# Patient Record
Sex: MALE | Race: WHITE | NOT HISPANIC OR LATINO | URBAN - METROPOLITAN AREA
[De-identification: names, ages, dates, MRNs, and addresses within clinical notes are randomized per-mention and may not be internally consistent; named-entity substitution may affect disease eponyms.]

---

## 2018-03-27 ENCOUNTER — OFFICE VISIT (OUTPATIENT)
Dept: AUDIOLOGY | Age: 63
End: 2018-03-27
Payer: COMMERCIAL

## 2018-03-27 DIAGNOSIS — H81.311 AUDITORY VERTIGO INVOLVING RIGHT EAR: Primary | ICD-10-CM

## 2018-03-27 PROCEDURE — 92540 BASIC VESTIBULAR EVALUATION: CPT | Performed by: AUDIOLOGIST

## 2018-03-27 PROCEDURE — 92537 CALORIC VSTBLR TEST W/REC: CPT | Performed by: AUDIOLOGIST

## 2018-03-27 PROCEDURE — 92567 TYMPANOMETRY: CPT | Performed by: AUDIOLOGIST

## 2018-03-27 NOTE — PROGRESS NOTES
[facility] Report    Name:  Ken Curry  :  1955  Age:  63 y o  Date of Evaluation: 18     History: Dizziness  Reason for visit: Ken Curry is seen today at the request of Dr Emily Santos for an evaluation of hearing  Patient complains of dizziness and being off balance  Provoked by head turns, body movements, driving and walking down [place]le in the grocery store       Tympanometry:   Right: Type A Volume: 1 8  Pressure: -5  Compliance: 1 5   Left: Type A Volume: 2 0  Pressure: -30  Compliance: 0 4    Oculomotor battery:    Gaze:          Right: Normal        Left: Normal         Up: Normal        Down: Normal      Saccades: Normal     Tracking: Normal     Optokinetic: Normal    Positioning/Positionals:     [employer]:    Right: Negative  , Subject dizziness    Left: Negative  , Subject dizziness      Positionals:     Sitting: Normal    Supine: Normal    Head Right: Normal    Head Left: Normal    Body Right: Normal    Body Left[de-identified] Normal      Calorics:     Birthermal Caloric Irrigation: Abnormal Unilateral Weakness Right -39%      Recommendations: Consider vestibular physcial therapy evaluation and rehabilitation through Marcella Unger , CCC-A  Clinical Audiologist

## 2018-03-27 NOTE — LETTER
2018     [facility]    Patient: Taty Kent   YOB: 1955   Date of Visit: 3/27/2018       Dear Dr Huy Nair:    Thank you for referring Taty Kent to me for evaluation  Below are my notes for this consultation  If you have questions, please do not hesitate to call me  I look forward to following your patient along with you  Sincerely,        Beckieelieser Hung        CC: No Recipients  Beckie Hung  3/27/2018  3:41 PM  Signed  V[facility] Report    Name:  Taty Kent  :  1955  Age:  63 y o  Date of Evaluation: 18     History: Dizziness  Reason for visit: Taty Kent is seen today at the request of Dr Huy Nair for an evaluation of hearing  Patient complains of dizziness and being off balance  Provoked by head turns, body movements, driving and walking down [place] in the grocery store       Tympanometry:   Right: Type A Volume: 1 8  Pressure: -5  Compliance: 1 5   Left: Type A Volume: 2 0  Pressure: -30  Compliance: 0 4    Oculomotor battery:    Gaze:          Right: Normal        Left: Normal         Up: Normal        Down: Normal      Saccades: Normal     Tracking: Normal     Optokinetic: Normal    Positioning/Positionals:     [employer]:    Right: Negative  , Subject dizziness    Left: Negative  , Subject dizziness      Positionals:     Sitting: Normal    Supine: Normal    Head Right: Normal    Head Left: Normal    Body Right: Normal    Body Left[de-identified] Normal      Calorics:     Birthermal Caloric Irrigation: Abnormal Unilateral Weakness Right -39%      Recommendations: Consider vestibular physcial therapy evaluation and rehabilitation through [address], Marcella TILLMAN , CCC-A  Clinical Audiologist

## 2018-03-27 NOTE — LETTER
2018     Charles Morales,  [address]  [address] [id number]    Patient: Krista Ramirez   YOB: 1955   Date of Visit: 3/27/2018       Dear Dr Bran Morales:    Thank you for referring Krista Ramirez to me for evaluation  Below are my notes for this consultation  If you have questions, please do not hesitate to call me  I look forward to following your patient along with you  Sincerely,        [place]        CC: No Recipients  [place]  3/27/2018  3:39 PM  Sign at close encounter  [facility] Report    Name:  Krista Ramirez  :  1955  Age:  63 y o  Date of Evaluation: 18     History: Dizziness  Reason for visit: Krista Ramirez is seen today at the request of Dr Bran Morales for an evaluation of hearing  Patient complains of dizziness and being off balance  Provoked by head turns, body movements, driving and walking down [place] in the grocery store       Tympanometry:   Right: Type A Volume: 1 8  Pressure: -5  Compliance: 1 5   Left: Type A Volume: 2 0  Pressure: -30  Compliance: 0 4    Oculomotor battery:    Gaze:          Right: Normal        Left: Normal         Up: Normal        Down: Normal      Saccades: Normal     Tracking: Normal     Optokinetic: Normal    Positioning/Positionals:     [employer]:    Right: Negative  , Subject dizziness    Left: Negative  , Subject dizziness      Positionals:     Sitting: Normal    Supine: Normal    Head Right: Normal    Head Left: Normal    Body Right: Normal    Body Left[de-identified] Normal      Calorics:     Birthermal Caloric Irrigation: Abnormal Unilateral Weakness Right -39%      Recommendations: Consider vestibular physcial therapy evaluation and rehabilitation through Marcella Unger , CCC-A  Clinical Audiologist

## 2020-01-03 ENCOUNTER — TRANSCRIBE ORDERS (OUTPATIENT)
Dept: ADMINISTRATIVE | Facility: HOSPITAL | Age: 65
End: 2020-01-03

## 2020-01-03 DIAGNOSIS — K40.20 BILATERAL INGUINAL HERNIA WITHOUT OBSTRUCTION OR GANGRENE, RECURRENCE NOT SPECIFIED: Primary | ICD-10-CM

## 2022-10-18 LAB
LEFT EYE DIABETIC RETINOPATHY: NORMAL
RIGHT EYE DIABETIC RETINOPATHY: NORMAL

## 2023-07-09 ENCOUNTER — APPOINTMENT (EMERGENCY)
Dept: CT IMAGING | Facility: HOSPITAL | Age: 68
DRG: 868 | End: 2023-07-09
Payer: COMMERCIAL

## 2023-07-09 ENCOUNTER — APPOINTMENT (EMERGENCY)
Dept: RADIOLOGY | Facility: HOSPITAL | Age: 68
DRG: 868 | End: 2023-07-09
Payer: COMMERCIAL

## 2023-07-09 ENCOUNTER — HOSPITAL ENCOUNTER (INPATIENT)
Facility: HOSPITAL | Age: 68
LOS: 2 days | Discharge: HOME | DRG: 868 | End: 2023-07-12
Attending: EMERGENCY MEDICINE | Admitting: INTERNAL MEDICINE
Payer: COMMERCIAL

## 2023-07-09 DIAGNOSIS — M25.512 LEFT SHOULDER PAIN: ICD-10-CM

## 2023-07-09 DIAGNOSIS — I48.91 ATRIAL FIBRILLATION WITH RVR (HCC): Primary | ICD-10-CM

## 2023-07-09 DIAGNOSIS — E11.65 TYPE 2 DIABETES MELLITUS WITH HYPERGLYCEMIA, WITHOUT LONG-TERM CURRENT USE OF INSULIN (HCC): ICD-10-CM

## 2023-07-09 DIAGNOSIS — A69.20 LYME DISEASE: ICD-10-CM

## 2023-07-09 PROBLEM — M25.531 RIGHT WRIST PAIN: Status: ACTIVE | Noted: 2023-07-09

## 2023-07-09 PROBLEM — M25.539 WRIST PAIN: Status: ACTIVE | Noted: 2023-07-09

## 2023-07-09 LAB
2HR DELTA HS TROPONIN: -1 NG/L
4HR DELTA HS TROPONIN: -2 NG/L
ALBUMIN SERPL BCP-MCNC: 4.1 G/DL (ref 3.5–5)
ALP SERPL-CCNC: 118 U/L (ref 34–104)
ALT SERPL W P-5'-P-CCNC: 14 U/L (ref 7–52)
ANION GAP SERPL CALCULATED.3IONS-SCNC: 10 MMOL/L
APTT PPP: 27 SECONDS (ref 23–37)
APTT PPP: 28 SECONDS (ref 23–37)
APTT PPP: 29 SECONDS (ref 23–37)
AST SERPL W P-5'-P-CCNC: 9 U/L (ref 13–39)
ATRIAL RATE: 122 BPM
ATRIAL RATE: 170 BPM
BACTERIA UR QL AUTO: NORMAL /HPF
BASE EX.OXY STD BLDV CALC-SCNC: 82.4 % (ref 60–80)
BASE EXCESS BLDV CALC-SCNC: -3.5 MMOL/L
BASOPHILS # BLD AUTO: 0.11 THOUSANDS/ÂΜL (ref 0–0.1)
BASOPHILS NFR BLD AUTO: 1 % (ref 0–1)
BILIRUB SERPL-MCNC: 1.31 MG/DL (ref 0.2–1)
BILIRUB UR QL STRIP: NEGATIVE
BNP SERPL-MCNC: 139 PG/ML (ref 0–100)
BUN SERPL-MCNC: 18 MG/DL (ref 5–25)
CALCIUM SERPL-MCNC: 9.5 MG/DL (ref 8.4–10.2)
CARDIAC TROPONIN I PNL SERPL HS: 4 NG/L
CARDIAC TROPONIN I PNL SERPL HS: 5 NG/L
CARDIAC TROPONIN I PNL SERPL HS: 6 NG/L
CHLORIDE SERPL-SCNC: 99 MMOL/L (ref 96–108)
CLARITY UR: CLEAR
CO2 SERPL-SCNC: 19 MMOL/L (ref 21–32)
COLOR UR: ABNORMAL
CREAT SERPL-MCNC: 1.06 MG/DL (ref 0.6–1.3)
CRP SERPL QL: 119.1 MG/L
EOSINOPHIL # BLD AUTO: 0.05 THOUSAND/ÂΜL (ref 0–0.61)
EOSINOPHIL NFR BLD AUTO: 0 % (ref 0–6)
ERYTHROCYTE [DISTWIDTH] IN BLOOD BY AUTOMATED COUNT: 11.6 % (ref 11.6–15.1)
GFR SERPL CREATININE-BSD FRML MDRD: 73 ML/MIN/1.73SQ M
GLUCOSE SERPL-MCNC: 200 MG/DL (ref 65–140)
GLUCOSE SERPL-MCNC: 325 MG/DL (ref 65–140)
GLUCOSE SERPL-MCNC: 403 MG/DL (ref 65–140)
GLUCOSE UR STRIP-MCNC: ABNORMAL MG/DL
HCO3 BLDV-SCNC: 20.4 MMOL/L (ref 24–30)
HCT VFR BLD AUTO: 48.1 % (ref 36.5–49.3)
HGB BLD-MCNC: 16.9 G/DL (ref 12–17)
HGB UR QL STRIP.AUTO: ABNORMAL
IMM GRANULOCYTES # BLD AUTO: 0.09 THOUSAND/UL (ref 0–0.2)
IMM GRANULOCYTES NFR BLD AUTO: 1 % (ref 0–2)
INR PPP: 1.12 (ref 0.84–1.19)
INR PPP: 1.12 (ref 0.84–1.19)
KETONES UR STRIP-MCNC: ABNORMAL MG/DL
LACTATE SERPL-SCNC: 1.1 MMOL/L (ref 0.5–2)
LEUKOCYTE ESTERASE UR QL STRIP: ABNORMAL
LYMPHOCYTES # BLD AUTO: 1.92 THOUSANDS/ÂΜL (ref 0.6–4.47)
LYMPHOCYTES NFR BLD AUTO: 12 % (ref 14–44)
MCH RBC QN AUTO: 31.5 PG (ref 26.8–34.3)
MCHC RBC AUTO-ENTMCNC: 35.1 G/DL (ref 31.4–37.4)
MCV RBC AUTO: 90 FL (ref 82–98)
MONOCYTES # BLD AUTO: 1.48 THOUSAND/ÂΜL (ref 0.17–1.22)
MONOCYTES NFR BLD AUTO: 10 % (ref 4–12)
NEUTROPHILS # BLD AUTO: 11.94 THOUSANDS/ÂΜL (ref 1.85–7.62)
NEUTS SEG NFR BLD AUTO: 76 % (ref 43–75)
NITRITE UR QL STRIP: NEGATIVE
NON-SQ EPI CELLS URNS QL MICRO: NORMAL /HPF
NRBC BLD AUTO-RTO: 0 /100 WBCS
O2 CT BLDV-SCNC: 20.2 ML/DL
OSMOLALITY UR/SERPL-RTO: 293 MMOL/KG (ref 282–298)
OSMOLALITY UR: 594 MMOL/KG
PCO2 BLDV: 34.2 MM HG (ref 42–50)
PH BLDV: 7.39 [PH] (ref 7.3–7.4)
PH UR STRIP.AUTO: 5 [PH]
PLATELET # BLD AUTO: 250 THOUSANDS/UL (ref 149–390)
PMV BLD AUTO: 9.4 FL (ref 8.9–12.7)
PO2 BLDV: 49.6 MM HG (ref 35–45)
POTASSIUM SERPL-SCNC: 4.6 MMOL/L (ref 3.5–5.3)
PROT SERPL-MCNC: 7.4 G/DL (ref 6.4–8.4)
PROT UR STRIP-MCNC: ABNORMAL MG/DL
PROTHROMBIN TIME: 14.2 SECONDS (ref 11.6–14.5)
PROTHROMBIN TIME: 14.2 SECONDS (ref 11.6–14.5)
QRS AXIS: 19 DEGREES
QRS AXIS: 22 DEGREES
QRSD INTERVAL: 76 MS
QRSD INTERVAL: 78 MS
QT INTERVAL: 310 MS
QT INTERVAL: 318 MS
QTC INTERVAL: 414 MS
QTC INTERVAL: 491 MS
RBC # BLD AUTO: 5.36 MILLION/UL (ref 3.88–5.62)
RBC #/AREA URNS AUTO: NORMAL /HPF
SODIUM 24H UR-SCNC: 54 MOL/L
SODIUM SERPL-SCNC: 128 MMOL/L (ref 135–147)
SP GR UR STRIP.AUTO: 1.03 (ref 1–1.03)
T WAVE AXIS: 60 DEGREES
T WAVE AXIS: 64 DEGREES
TSH SERPL DL<=0.05 MIU/L-ACNC: 3.17 UIU/ML (ref 0.45–4.5)
URATE SERPL-MCNC: 3.1 MG/DL (ref 3.5–8.5)
UROBILINOGEN UR STRIP-ACNC: <2 MG/DL
VENTRICULAR RATE: 102 BPM
VENTRICULAR RATE: 151 BPM
WBC # BLD AUTO: 15.59 THOUSAND/UL (ref 4.31–10.16)
WBC #/AREA URNS AUTO: NORMAL /HPF

## 2023-07-09 PROCEDURE — 86140 C-REACTIVE PROTEIN: CPT | Performed by: INTERNAL MEDICINE

## 2023-07-09 PROCEDURE — 85610 PROTHROMBIN TIME: CPT | Performed by: EMERGENCY MEDICINE

## 2023-07-09 PROCEDURE — 93010 ELECTROCARDIOGRAM REPORT: CPT | Performed by: INTERNAL MEDICINE

## 2023-07-09 PROCEDURE — 96361 HYDRATE IV INFUSION ADD-ON: CPT

## 2023-07-09 PROCEDURE — 85025 COMPLETE CBC W/AUTO DIFF WBC: CPT | Performed by: EMERGENCY MEDICINE

## 2023-07-09 PROCEDURE — 84484 ASSAY OF TROPONIN QUANT: CPT | Performed by: EMERGENCY MEDICINE

## 2023-07-09 PROCEDURE — 71275 CT ANGIOGRAPHY CHEST: CPT

## 2023-07-09 PROCEDURE — 85610 PROTHROMBIN TIME: CPT | Performed by: INTERNAL MEDICINE

## 2023-07-09 PROCEDURE — 99222 1ST HOSP IP/OBS MODERATE 55: CPT | Performed by: INTERNAL MEDICINE

## 2023-07-09 PROCEDURE — 83880 ASSAY OF NATRIURETIC PEPTIDE: CPT | Performed by: EMERGENCY MEDICINE

## 2023-07-09 PROCEDURE — 80053 COMPREHEN METABOLIC PANEL: CPT | Performed by: EMERGENCY MEDICINE

## 2023-07-09 PROCEDURE — 81001 URINALYSIS AUTO W/SCOPE: CPT | Performed by: INTERNAL MEDICINE

## 2023-07-09 PROCEDURE — 73030 X-RAY EXAM OF SHOULDER: CPT

## 2023-07-09 PROCEDURE — 84300 ASSAY OF URINE SODIUM: CPT | Performed by: INTERNAL MEDICINE

## 2023-07-09 PROCEDURE — 82948 REAGENT STRIP/BLOOD GLUCOSE: CPT

## 2023-07-09 PROCEDURE — 93005 ELECTROCARDIOGRAM TRACING: CPT

## 2023-07-09 PROCEDURE — 96376 TX/PRO/DX INJ SAME DRUG ADON: CPT

## 2023-07-09 PROCEDURE — 86617 LYME DISEASE ANTIBODY: CPT

## 2023-07-09 PROCEDURE — 85730 THROMBOPLASTIN TIME PARTIAL: CPT | Performed by: EMERGENCY MEDICINE

## 2023-07-09 PROCEDURE — 96374 THER/PROPH/DIAG INJ IV PUSH: CPT

## 2023-07-09 PROCEDURE — 86618 LYME DISEASE ANTIBODY: CPT | Performed by: INTERNAL MEDICINE

## 2023-07-09 PROCEDURE — 99204 OFFICE O/P NEW MOD 45 MIN: CPT | Performed by: PHYSICIAN ASSISTANT

## 2023-07-09 PROCEDURE — 82805 BLOOD GASES W/O2 SATURATION: CPT | Performed by: EMERGENCY MEDICINE

## 2023-07-09 PROCEDURE — 83935 ASSAY OF URINE OSMOLALITY: CPT | Performed by: INTERNAL MEDICINE

## 2023-07-09 PROCEDURE — 85730 THROMBOPLASTIN TIME PARTIAL: CPT | Performed by: INTERNAL MEDICINE

## 2023-07-09 PROCEDURE — 84550 ASSAY OF BLOOD/URIC ACID: CPT | Performed by: INTERNAL MEDICINE

## 2023-07-09 PROCEDURE — 96375 TX/PRO/DX INJ NEW DRUG ADDON: CPT

## 2023-07-09 PROCEDURE — 84443 ASSAY THYROID STIM HORMONE: CPT | Performed by: INTERNAL MEDICINE

## 2023-07-09 PROCEDURE — 83930 ASSAY OF BLOOD OSMOLALITY: CPT | Performed by: INTERNAL MEDICINE

## 2023-07-09 PROCEDURE — 99285 EMERGENCY DEPT VISIT HI MDM: CPT

## 2023-07-09 PROCEDURE — 99291 CRITICAL CARE FIRST HOUR: CPT | Performed by: EMERGENCY MEDICINE

## 2023-07-09 PROCEDURE — 84484 ASSAY OF TROPONIN QUANT: CPT | Performed by: INTERNAL MEDICINE

## 2023-07-09 PROCEDURE — 36415 COLL VENOUS BLD VENIPUNCTURE: CPT | Performed by: EMERGENCY MEDICINE

## 2023-07-09 PROCEDURE — 83605 ASSAY OF LACTIC ACID: CPT | Performed by: EMERGENCY MEDICINE

## 2023-07-09 RX ORDER — LIFITEGRAST 50 MG/ML
SOLUTION/ DROPS OPHTHALMIC 2 TIMES DAILY
COMMUNITY
Start: 2023-04-04 | End: 2023-07-12

## 2023-07-09 RX ORDER — OXYCODONE HYDROCHLORIDE 5 MG/1
5 TABLET ORAL EVERY 4 HOURS PRN
Status: DISCONTINUED | OUTPATIENT
Start: 2023-07-09 | End: 2023-07-12 | Stop reason: HOSPADM

## 2023-07-09 RX ORDER — ACETAMINOPHEN 325 MG/1
975 TABLET ORAL EVERY 8 HOURS SCHEDULED
Status: DISCONTINUED | OUTPATIENT
Start: 2023-07-09 | End: 2023-07-12 | Stop reason: HOSPADM

## 2023-07-09 RX ORDER — INSULIN LISPRO 100 [IU]/ML
1-6 INJECTION, SOLUTION INTRAVENOUS; SUBCUTANEOUS
Status: DISCONTINUED | OUTPATIENT
Start: 2023-07-09 | End: 2023-07-12 | Stop reason: HOSPADM

## 2023-07-09 RX ORDER — HEPARIN SODIUM 10000 [USP'U]/100ML
3-26 INJECTION, SOLUTION INTRAVENOUS
Status: DISCONTINUED | OUTPATIENT
Start: 2023-07-09 | End: 2023-07-10

## 2023-07-09 RX ORDER — DILTIAZEM HYDROCHLORIDE 5 MG/ML
30 INJECTION INTRAVENOUS ONCE
Status: COMPLETED | OUTPATIENT
Start: 2023-07-09 | End: 2023-07-09

## 2023-07-09 RX ORDER — HEPARIN SODIUM 1000 [USP'U]/ML
4000 INJECTION, SOLUTION INTRAVENOUS; SUBCUTANEOUS EVERY 6 HOURS PRN
Status: DISCONTINUED | OUTPATIENT
Start: 2023-07-09 | End: 2023-07-10

## 2023-07-09 RX ORDER — DILTIAZEM HYDROCHLORIDE 5 MG/ML
20 INJECTION INTRAVENOUS ONCE
Status: COMPLETED | OUTPATIENT
Start: 2023-07-09 | End: 2023-07-09

## 2023-07-09 RX ORDER — LIDOCAINE 50 MG/G
1 PATCH TOPICAL DAILY
Status: DISCONTINUED | OUTPATIENT
Start: 2023-07-09 | End: 2023-07-12 | Stop reason: HOSPADM

## 2023-07-09 RX ORDER — ONDANSETRON 2 MG/ML
4 INJECTION INTRAMUSCULAR; INTRAVENOUS EVERY 4 HOURS PRN
Status: DISCONTINUED | OUTPATIENT
Start: 2023-07-09 | End: 2023-07-12 | Stop reason: HOSPADM

## 2023-07-09 RX ORDER — HEPARIN SODIUM 1000 [USP'U]/ML
2000 INJECTION, SOLUTION INTRAVENOUS; SUBCUTANEOUS EVERY 6 HOURS PRN
Status: DISCONTINUED | OUTPATIENT
Start: 2023-07-09 | End: 2023-07-10

## 2023-07-09 RX ORDER — AMOXICILLIN 250 MG
1 CAPSULE ORAL
Status: DISCONTINUED | OUTPATIENT
Start: 2023-07-09 | End: 2023-07-12 | Stop reason: HOSPADM

## 2023-07-09 RX ORDER — METOPROLOL TARTRATE 5 MG/5ML
5 INJECTION INTRAVENOUS EVERY 6 HOURS PRN
Status: DISCONTINUED | OUTPATIENT
Start: 2023-07-09 | End: 2023-07-12 | Stop reason: HOSPADM

## 2023-07-09 RX ORDER — DOXYCYCLINE HYCLATE 100 MG/1
100 CAPSULE ORAL EVERY 12 HOURS SCHEDULED
Status: DISCONTINUED | OUTPATIENT
Start: 2023-07-09 | End: 2023-07-12 | Stop reason: HOSPADM

## 2023-07-09 RX ORDER — SODIUM CHLORIDE, SODIUM GLUCONATE, SODIUM ACETATE, POTASSIUM CHLORIDE, MAGNESIUM CHLORIDE, SODIUM PHOSPHATE, DIBASIC, AND POTASSIUM PHOSPHATE .53; .5; .37; .037; .03; .012; .00082 G/100ML; G/100ML; G/100ML; G/100ML; G/100ML; G/100ML; G/100ML
75 INJECTION, SOLUTION INTRAVENOUS CONTINUOUS
Status: DISCONTINUED | OUTPATIENT
Start: 2023-07-09 | End: 2023-07-10

## 2023-07-09 RX ORDER — LISINOPRIL 20 MG/1
20 TABLET ORAL DAILY
COMMUNITY
Start: 2023-06-07 | End: 2023-07-12

## 2023-07-09 RX ORDER — MORPHINE SULFATE 4 MG/ML
4 INJECTION, SOLUTION INTRAMUSCULAR; INTRAVENOUS ONCE
Status: COMPLETED | OUTPATIENT
Start: 2023-07-09 | End: 2023-07-09

## 2023-07-09 RX ORDER — ACETAMINOPHEN 325 MG/1
650 TABLET ORAL EVERY 6 HOURS PRN
Status: DISCONTINUED | OUTPATIENT
Start: 2023-07-09 | End: 2023-07-09

## 2023-07-09 RX ADMIN — SODIUM CHLORIDE, SODIUM GLUCONATE, SODIUM ACETATE, POTASSIUM CHLORIDE AND MAGNESIUM CHLORIDE 75 ML/HR: 526; 502; 368; 37; 30 INJECTION, SOLUTION INTRAVENOUS at 21:25

## 2023-07-09 RX ADMIN — MORPHINE SULFATE 4 MG: 4 INJECTION INTRAVENOUS at 10:07

## 2023-07-09 RX ADMIN — IOHEXOL 85 ML: 350 INJECTION, SOLUTION INTRAVENOUS at 10:50

## 2023-07-09 RX ADMIN — DILTIAZEM HYDROCHLORIDE 30 MG: 5 INJECTION INTRAVENOUS at 11:09

## 2023-07-09 RX ADMIN — HEPARIN SODIUM 4000 UNITS: 1000 INJECTION INTRAVENOUS; SUBCUTANEOUS at 20:27

## 2023-07-09 RX ADMIN — DOXYCYCLINE 100 MG: 100 CAPSULE ORAL at 21:28

## 2023-07-09 RX ADMIN — INSULIN LISPRO 2 UNITS: 100 INJECTION, SOLUTION INTRAVENOUS; SUBCUTANEOUS at 21:32

## 2023-07-09 RX ADMIN — ACETAMINOPHEN 975 MG: 325 TABLET, FILM COATED ORAL at 21:27

## 2023-07-09 RX ADMIN — SODIUM CHLORIDE 1000 ML: 0.9 INJECTION, SOLUTION INTRAVENOUS at 11:05

## 2023-07-09 RX ADMIN — METOPROLOL TARTRATE 25 MG: 25 TABLET, FILM COATED ORAL at 14:43

## 2023-07-09 RX ADMIN — DILTIAZEM HYDROCHLORIDE 60 MG: 30 TABLET, FILM COATED ORAL at 11:45

## 2023-07-09 RX ADMIN — DICLOFENAC SODIUM 2 G: 10 GEL TOPICAL at 21:34

## 2023-07-09 RX ADMIN — LIDOCAINE 5% 1 PATCH: 700 PATCH TOPICAL at 13:37

## 2023-07-09 RX ADMIN — DICLOFENAC SODIUM 2 G: 10 GEL TOPICAL at 14:44

## 2023-07-09 RX ADMIN — OXYCODONE HYDROCHLORIDE 5 MG: 5 TABLET ORAL at 17:18

## 2023-07-09 RX ADMIN — ACETAMINOPHEN 975 MG: 325 TABLET, FILM COATED ORAL at 14:43

## 2023-07-09 RX ADMIN — HEPARIN SODIUM 11.1 UNITS/KG/HR: 10000 INJECTION, SOLUTION INTRAVENOUS at 14:02

## 2023-07-09 RX ADMIN — METOPROLOL TARTRATE 25 MG: 25 TABLET, FILM COATED ORAL at 21:27

## 2023-07-09 RX ADMIN — DILTIAZEM HYDROCHLORIDE 20 MG: 5 INJECTION INTRAVENOUS at 10:15

## 2023-07-09 RX ADMIN — INSULIN LISPRO 5 UNITS: 100 INJECTION, SOLUTION INTRAVENOUS; SUBCUTANEOUS at 18:33

## 2023-07-09 RX ADMIN — DOXYCYCLINE 100 MG: 100 CAPSULE ORAL at 14:43

## 2023-07-09 NOTE — ASSESSMENT & PLAN NOTE
Lab Results   Component Value Date    HGBA1C 9.8 (H) 04/13/2023       No results for input(s): "POCGLU" in the last 72 hours.     Blood Sugar Average: Last 72 hrs:  · Hemoglobin A1c not at goal, patient states he stopped taking metformin due to side effects (was feeling weak, lightheaded)   · Correctional scale insulin  · Hypoglycemia protocol

## 2023-07-09 NOTE — ASSESSMENT & PLAN NOTE
Patient presenting for evaluation of pain in his shoulder  X-ray with calcific tendinitis  Does have history of tick bites, concern for Lyme arthritis we will start doxycycline 100 mg.   Lidocaine patch and Tylenol as needed ordered  Patient states he wants a second opinion, orthopedic surgery was consulted

## 2023-07-09 NOTE — ASSESSMENT & PLAN NOTE
In the setting of uncontrolled diabetes, corrected sodium was 133  Patient received 1 L bolus in the emergency department, will hold further fluids for now   Urine lites ordered  Monitor kidney function

## 2023-07-09 NOTE — CONSULTS
Orthopedics   Adam Pennington 74 y.o. male MRN: [id number]  Unit/Bed#: MO CT SCAN      Chief Complaint:   Left shoulder pain    HPI:  74 y.o. male with Left shoulder pain ongoing since Friday. Patient states he recently had hernia surgery 06/28/2023 and has been on light duty since; he has not bee lifting anything heavy. He states he go into his truck Friday and "cleared a spider web, and I saw the spider, I think it got me". Patient states his Right had started to hurt and noticed some swelling. He states the Right hand pain resolved Friday night and then he started to have Left shoulder pain. He admits to pain with range of motion and micromotion. He admits it was painful to the touch on Saturday, but that resolved. He admits to Left hand and wrist swelling and pain, as well. He lives at home with his wife and does not use assistive device for ambulation. Per patient's chart, he is on light duty with restrictions of no lifting greater than 20lbs for 6 weeks post operatively. He states he is doing well s/p surgical intervention without complications. Denies history of gout. Review Of Systems:   · Skin: Normal  · Neuro: See HPI  · Musculoskeletal: See HPI  · 14 point review of systems negative except as stated above     Past Medical History:   Past Medical History:   Diagnosis Date   • Diabetes mellitus ([address])    • Hypertension    • Meniere's disease        Past Surgical History:   Past Surgical History:   Procedure Laterality Date   • HERNIA REPAIR         Family History:  Family history reviewed and non-contributory  History reviewed. No pertinent family history.     Social History:  Social History     Socioeconomic History   • Marital status: /Civil Union     Spouse name: None   • Number of children: None   • Years of education: None   • Highest education level: None   Occupational History   • None   Tobacco Use   • Smoking status: Some Days     Types: Cigarettes   • Smokeless tobacco: Never   Vaping Use • Vaping Use: None   Substance and Sexual Activity   • Alcohol use: Never   • Drug use: Never   • Sexual activity: None   Other Topics Concern   • None   Social History Narrative   • None     Social Determinants of Health     Financial Resource Strain: Not on file   Food Insecurity: Not on file   Transportation Needs: Not on file   Physical Activity: Not on file   Stress: Not on file   Social Connections: Not on file   Intimate Partner Violence: Not on file   Housing Stability: Not on file       Allergies:   No Known Allergies        Labs:  0   Lab Value Date/Time    HCT 48.1 07/09/2023 1003    HGB 16.9 07/09/2023 1003    INR 1.12 07/09/2023 1003    WBC 15.59 (H) 07/09/2023 1003       Meds:    Current Facility-Administered Medications:   •  acetaminophen (TYLENOL) tablet 650 mg, 650 mg, Oral, Q6H PRN, Zechariah Willett MD  •  heparin (porcine) 25,000 units in 0.45% NaCl 250 mL infusion (premix), 3-20 Units/kg/hr (Order-Specific), Intravenous, Titrated, Zechariah Willett MD  •  heparin (porcine) injection 2,000 Units, 2,000 Units, Intravenous, Q6H PRN, Zechariah Willett MD  •  heparin (porcine) injection 4,000 Units, 4,000 Units, Intravenous, Q6H PRN, Gi Moore MD  •  insulin lispro (HumaLOG) 100 units/mL subcutaneous injection 1-6 Units, 1-6 Units, Subcutaneous, TID AC **AND** Fingerstick Glucose (POCT), , , TID AC, Gi Moore MD  •  insulin lispro (HumaLOG) 100 units/mL subcutaneous injection 1-6 Units, 1-6 Units, Subcutaneous, HS, Zechariah Willett MD  •  lidocaine (LIDODERM) 5 % patch 1 patch, 1 patch, Topical, Daily, Anna Moore MD, 1 patch at 07/09/23 1337  •  metoprolol (LOPRESSOR) injection 5 mg, 5 mg, Intravenous, Q6H PRN, Zechariah Willett MD  •  metoprolol tartrate (LOPRESSOR) tablet 25 mg, 25 mg, Oral, Q12H [address], Gi Carter MD    Current Outpatient Medications:   •  lifitegrast (Xiidra) 5 % op solution, 2 (two) times a day, Disp: , Rfl:   •  lisinopril (ZESTRIL) 20 mg tablet, Take 20 mg by mouth daily, Disp: , Rfl:     Blood Culture:   No results found for: "BLOODCX"    Wound Culture:   No results found for: "WOUNDCULT"    Ins and Outs:  I/O last 24 hours: In: 2000 [IV Piggyback:2000]  Out: -           Physical Exam:   /72   Pulse 97   Temp 98.6 °F (37 °C)   Resp 20   Wt 92 kg (202 lb 13.2 oz)   SpO2 93%   Gen: No acute distress, resting comfortably in bed  HEENT: Eyes clear, moist mucus membranes, hearing intact  Respiratory: No audible wheezing or stridor  Cardiovascular: Well Perfused peripherally, 2+ distal pulse  Abdomen: nondistended, no peritoneal signs    Musculoskeletal:   Left shoulder pain  · Skin without evidence of erythema or eccymosis. Anterior shoulder without obvious bug bite or wound. · TTP anterior shoulder  · Full active & passive ROM at elbow. · Able to perform full composite fist, mild discomfort in hand secondary to swelling. · Active range of motion of Left shoulder approximately 105 degrees forward flexion with pain at terminal flexion   · SILT m/r/u.   · Motor intact ain/pin/m/r/u  · 2+ radial and ulnar pulse  · Musculature is soft and compressible, no pain with passive stretch      Left wrist/hand   · IV placed Left dorsal hand  · Possible area of erythema at distal ulna, nontender  · Discomfort with wrist ulnar deviation and extension  · Wrist extension 50 degrees with pain  · Wrist flexion to 65-70 degrees with mild discomfort  · NV intact distally, extremity warm and well perfused    Tertiary: no tenderness over all other joints/long bones as except already stated. Radiology:   I personally reviewed the films. X-rays taken of Left shoulder demonstrates no fracture, dislocation or significant degenerative changes. Possible calcific density noted at greater tuberosity of humerus.    _*_*_*_*_*_*_*_*_*_*_*_*_*_*_*_*_*_*_*_*_*_*_*_*_*_*_*_*_*_*_*_*_*_*_*_*_*_*_*_*_*    Assessment:  74 y. o.male Left shoulder pain, likely cellulitis, possible bug/tickspider bite, multiple joint complaints     Plan:   · Weightbearing as tolerated Left upper extremity  · PT/OT- encourage shoulder range of motion  · Pain control per primary team  · DVT ppx per primary team  · Dispo: Discussed with SLIM. Would recommend course of antibiotics at discretion of SLIM or Infectious disease, consider Doxycycline. Can also consider antibiotic treatment for cellulitis of Left shoulder. Would recommend working with therapy to work on passive and active range of motion of the Left shoulder, wrist and digits. Can apply ice over Left shoulder and wrist for comfort. · Can consider IV NSAIDs for pain relief. · Would recommend against glenohumeral CSI at this time secondary to multiple joint complaints and patient's history of diabetes. Discussed with [facility]. · No emergent orthopaedic surgical intervention at this time. No further orthopaedic intervention at this time. If additional questions or concerns please reach out.         Krish Nicholson PA-C

## 2023-07-09 NOTE — ASSESSMENT & PLAN NOTE
Patient presenting for evaluation of pain in his shoulder  X-ray with calcific tendinitis  Lidocaine patch and Tylenol as needed ordered  Patient states he wants a second opinion, orthopedic surgery was consulted

## 2023-07-09 NOTE — ED PROVIDER NOTES
History  Chief Complaint   Patient presents with   • Arm Pain     Patient c/o left shoulder pain since yesterday following possible spider bite. HPI  51-year-old male with past medical history of diabetes, hypertension,  aortic regurgitation, recent umbilical hernia repair 3/41/0688 presents with left shoulder pain started 2 days ago. He has a small lesion to his left shoulder and is unsure if a spider bit him as he was cleaning spider webs. Upon assessment, patient is in a rapid A-fib, patient denies any history of A-fib, denies palpitations. Denies fevers or chills. Prior to Admission Medications   Prescriptions Last Dose Informant Patient Reported? Taking? lifitegrast (Xiidra) 5 % op solution   Yes Yes   Si (two) times a day   lisinopril (ZESTRIL) 20 mg tablet   Yes Yes   Sig: Take 20 mg by mouth daily      Facility-Administered Medications: None       Past Medical History:   Diagnosis Date   • Diabetes mellitus ([address])    • Hypertension    • Meniere's disease        Past Surgical History:   Procedure Laterality Date   • HERNIA REPAIR         History reviewed. No pertinent family history. I have reviewed and agree with the history as documented. E-Cigarette/Vaping     E-Cigarette/Vaping Substances   • Nicotine No    • THC No    • CBD No    • Flavoring No    • Other No    • Unknown No      Social History     Tobacco Use   • Smoking status: Some Days     Types: Cigarettes   • Smokeless tobacco: Never   Substance Use Topics   • Alcohol use: Never   • Drug use: Never       Review of Systems   Constitutional: Negative for chills and fever. HENT: Negative for dental problem and ear pain. Eyes: Negative for pain and redness. Respiratory: Negative for cough and shortness of breath. Cardiovascular: Negative for chest pain and palpitations. Gastrointestinal: Negative for abdominal pain and nausea. Endocrine: Negative for polydipsia and polyphagia.    Genitourinary: Negative for dysuria and frequency. Musculoskeletal: Positive for arthralgias. Negative for joint swelling. Skin: Negative for color change and rash. Neurological: Negative for dizziness and headaches. Psychiatric/Behavioral: Negative for behavioral problems and confusion. All other systems reviewed and are negative. Physical Exam  Physical Exam  Vitals and nursing note reviewed. Constitutional:       General: He is not in acute distress. Appearance: He is well-developed. He is not diaphoretic. HENT:      Head: Atraumatic. Right Ear: External ear normal.      Left Ear: External ear normal.      Nose: Nose normal.   Eyes:      Conjunctiva/sclera: Conjunctivae normal.      Pupils: Pupils are equal, round, and reactive to light. Neck:      Vascular: No JVD. Cardiovascular:      Rate and Rhythm: Tachycardia present. Rhythm irregular. Heart sounds: Normal heart sounds. No murmur heard. Pulmonary:      Effort: Pulmonary effort is normal. No respiratory distress. Breath sounds: Normal breath sounds. No wheezing. Abdominal:      General: Bowel sounds are normal. There is no distension. Palpations: Abdomen is soft. Tenderness: There is no abdominal tenderness. Musculoskeletal:         General: Normal range of motion. Cervical back: Normal range of motion and neck supple. Comments: Left shoulder decreased movement due to pain, neurovascularly intact distally, punctate lesion to right shoulder no surrounding erythema   Skin:     General: Skin is warm and dry. Capillary Refill: Capillary refill takes less than 2 seconds. Neurological:      Mental Status: He is alert and oriented to person, place, and time. Cranial Nerves: No cranial nerve deficit.    Psychiatric:         Behavior: Behavior normal.         Vital Signs  ED Triage Vitals   Temperature Pulse Respirations Blood Pressure SpO2   07/09/23 0950 07/09/23 0950 07/09/23 0950 07/09/23 0954 07/09/23 0950   98.2 °F (36.8 °C) (!) 150 18 162/98 98 %      Temp Source Heart Rate Source Patient Position - Orthostatic VS BP Location FiO2 (%)   07/09/23 0950 07/09/23 1124 07/09/23 1045 07/09/23 1045 --   Oral Monitor Lying Right arm       Pain Score       --                  Vitals:    07/09/23 1315 07/09/23 1324 07/09/23 1443 07/09/23 1445   BP: 142/72 142/72 (!) 180/77 (!) 180/77   Pulse: 92 97 94 (!) 108   Patient Position - Orthostatic VS:    Lying         Visual Acuity      ED Medications  Medications   lidocaine (LIDODERM) 5 % patch 1 patch (1 patch Topical Medication Applied 7/9/23 1337)   insulin lispro (HumaLOG) 100 units/mL subcutaneous injection 1-6 Units (has no administration in time range)   insulin lispro (HumaLOG) 100 units/mL subcutaneous injection 1-6 Units (has no administration in time range)   metoprolol (LOPRESSOR) injection 5 mg (has no administration in time range)   metoprolol tartrate (LOPRESSOR) tablet 25 mg (25 mg Oral Given 7/9/23 1443)   heparin (porcine) 25,000 units in 0.45% NaCl 250 mL infusion (premix) (11.1 Units/kg/hr × 90 kg (Order-Specific) Intravenous New Bag 7/9/23 1402)   heparin (porcine) injection 4,000 Units (has no administration in time range)   heparin (porcine) injection 2,000 Units (has no administration in time range)   doxycycline hyclate (VIBRAMYCIN) capsule 100 mg (100 mg Oral Given 7/9/23 1443)   Diclofenac Sodium (VOLTAREN) 1 % topical gel 2 g (2 g Topical Given 7/9/23 1444)   acetaminophen (TYLENOL) tablet 975 mg (975 mg Oral Given 7/9/23 1443)   oxyCODONE (ROXICODONE) split tablet 2.5 mg (has no administration in time range)   oxyCODONE (ROXICODONE) IR tablet 5 mg (has no administration in time range)   senna-docusate sodium (SENOKOT S) 8.6-50 mg per tablet 1 tablet (has no administration in time range)   ondansetron (ZOFRAN) injection 4 mg (has no administration in time range)   naloxone (NARCAN) 0.04 mg/mL syringe 0.04 mg (has no administration in time range)   morphine injection 4 mg (4 mg Intravenous Given 7/9/23 1007)   diltiazem (CARDIZEM) injection 20 mg (20 mg Intravenous Given 7/9/23 1015)   sodium chloride 0.9 % bolus 1,000 mL (0 mL Intravenous Stopped 7/9/23 1214)   iohexol (OMNIPAQUE) 350 MG/ML injection (MULTI-DOSE) 85 mL (85 mL Intravenous Given 7/9/23 1050)   diltiazem (CARDIZEM) injection 30 mg (30 mg Intravenous Given 7/9/23 1109)   diltiazem (CARDIZEM) tablet 60 mg (60 mg Oral Given 7/9/23 1145)       Diagnostic Studies  Results Reviewed     Procedure Component Value Units Date/Time    Uric acid [[id number]]  (Abnormal) Collected: 07/09/23 1337    Lab Status: Final result Specimen: Blood from Arm, Right Updated: 07/09/23 1458     Uric Acid 3.1 mg/dL     Narrative:      N-acetyl-p-benzoquinone imine (metabolite of Acetaminophen) will generate erroneously low results in samples for patients that have taken an overdose of Acetaminophen.     Procalcitonin [[id number]]     Lab Status: No result Specimen: Blood     Sedimentation rate, automated [[id number]]     Lab Status: No result Specimen: Blood     C-reactive protein [[id number]]     Lab Status: No result Specimen: Blood     HS Troponin I 4hr [[id number]]  (Normal) Collected: 07/09/23 1359    Lab Status: Final result Specimen: Blood from Arm, Right Updated: 07/09/23 1439     hs TnI 4hr 4 ng/L      Delta 4hr hsTnI -2 ng/L     TSH, 3rd generation with Free T4 reflex [[id number]]  (Normal) Collected: 07/09/23 1337    Lab Status: Final result Specimen: Blood from Arm, Right Updated: 07/09/23 1433     TSH 3RD GENERATON 3.172 uIU/mL     Urine Microscopic [[id number]]  (Normal) Collected: 07/09/23 1340    Lab Status: Final result Specimen: Urine, Clean Catch Updated: 07/09/23 1429     RBC, UA 1-2 /hpf      WBC, UA None Seen /hpf      Epithelial Cells None Seen /hpf      Bacteria, UA None Seen /hpf     APTT [[id number]]  (Normal) Collected: 07/09/23 1359    Lab Status: Final result Specimen: Blood from Arm, Right Updated: 07/09/23 1426     PTT 27 seconds     Protime-INR [[id number]]  (Normal) Collected: 07/09/23 1359    Lab Status: Final result Specimen: Blood from Arm, Right Updated: 07/09/23 1426     Protime 14.2 seconds      INR 1.12    UA w Reflex to Microscopic w Reflex to Culture [[id number]]  (Abnormal) Collected: 07/09/23 1340    Lab Status: Final result Specimen: Urine, Clean Catch Updated: 07/09/23 1413     Color, UA Light Yellow     Clarity, UA Clear     Specific Gravity, UA 1.033     pH, UA 5.0     Leukocytes, UA Elevated glucose may cause decreased leukocyte values. See urine microscopic for UWBC result     Nitrite, UA Negative     Protein, UA Trace mg/dl      Glucose, UA >=1000 (1%) mg/dl      Ketones, UA 10 (1+) mg/dl      Urobilinogen, UA <2.0 mg/dl      Bilirubin, UA Negative     Occult Blood, UA Trace    Lyme Disease Serology w/Reflex [[id number]] Collected: 07/09/23 1359    Lab Status: In process Specimen: Arm, Right Updated: 07/09/23 1405    Sodium, urine, random [[id number]] Collected: 07/09/23 1339    Lab Status: Final result Specimen: Urine, Clean Catch Updated: 07/09/23 1356     Sodium, Ur 54    Osmolality, urine [[id number]] Collected: 07/09/23 1339    Lab Status: In process Specimen: Urine, Clean Catch Updated: 07/09/23 1346    Osmolality-"If this is regarding a toxic alcohol, STOP. Test is not routinely indicated. Please consult medical  on call for further guidance." [[id number]] Collected: 07/09/23 1337    Lab Status:  In process Specimen: Blood from Arm, Right Updated: 07/09/23 1343    HS Troponin I 2hr [[id number]]  (Normal) Collected: 07/09/23 1213    Lab Status: Final result Specimen: Blood from Arm, Right Updated: 07/09/23 1304     hs TnI 2hr 5 ng/L      Delta 2hr hsTnI -1 ng/L     HS Troponin 0hr (reflex protocol) [[id number]]  (Normal) Collected: 07/09/23 1003    Lab Status: Final result Specimen: Blood from Arm, Right Updated: 07/09/23 1120     hs TnI 0hr 6 ng/L     Blood gas, venous [[id number]]  (Abnormal) Collected: 07/09/23 1109    Lab Status: Final result Specimen: Blood from Foot, Right Updated: 07/09/23 1113     pH, Trae 7.394     pCO2, Trae 34.2 mm Hg      pO2, Trae 49.6 mm Hg      HCO3, Trae 20.4 mmol/L      Base Excess, Trae -3.5 mmol/L      O2 Content, Trae 20.2 ml/dL      O2 HGB, VENOUS 82.4 %     B-Type Natriuretic Peptide(BNP) [[id number]]  (Abnormal) Collected: 07/09/23 1003    Lab Status: Final result Specimen: Blood from Arm, Right Updated: 07/09/23 1049      pg/mL     Comprehensive metabolic panel [[id number]]  (Abnormal) Collected: 07/09/23 1003    Lab Status: Final result Specimen: Blood from Arm, Right Updated: 07/09/23 1041     Sodium 128 mmol/L      Potassium 4.6 mmol/L      Chloride 99 mmol/L      CO2 19 mmol/L      ANION GAP 10 mmol/L      BUN 18 mg/dL      Creatinine 1.06 mg/dL      Glucose 403 mg/dL      Calcium 9.5 mg/dL      AST 9 U/L      ALT 14 U/L      Alkaline Phosphatase 118 U/L      Total Protein 7.4 g/dL      Albumin 4.1 g/dL      Total Bilirubin 1.31 mg/dL      eGFR 73 ml/min/1.73sq m     Narrative:      [place] guidelines for Chronic Kidney Disease (CKD):   •  Stage 1 with normal or high GFR (GFR > 90 mL/min/1.73 square meters)  •  Stage 2 Mild CKD (GFR = 60-89 mL/min/1.73 square meters)  •  Stage 3A Moderate CKD (GFR = 45-59 mL/min/1.73 square meters)  •  Stage 3B Moderate CKD (GFR = 30-44 mL/min/1.73 square meters)  •  Stage 4 Severe CKD (GFR = 15-29 mL/min/1.73 square meters)  •  Stage 5 End Stage CKD (GFR <15 mL/min/1.73 square meters)  Note: GFR calculation is accurate only with a steady state creatinine    Lactic acid, plasma (w/reflex if result > 2.0) [[id number]]  (Normal) Collected: 07/09/23 1003    Lab Status: Final result Specimen: Blood from Arm, Right Updated: 07/09/23 1039     LACTIC ACID 1.1 mmol/L     Narrative:      Result may be elevated if tourniquet was used during collection.     APTT [[id number]]  (Normal) Collected: 07/09/23 1003    Lab Status: Final result Specimen: Blood from Arm, Right Updated: 07/09/23 1027     PTT 28 seconds     Protime-INR [[id number]]  (Normal) Collected: 07/09/23 1003    Lab Status: Final result Specimen: Blood from Arm, Right Updated: 07/09/23 1027     Protime 14.2 seconds      INR 1.12    CBC and differential [[id number]]  (Abnormal) Collected: 07/09/23 1003    Lab Status: Final result Specimen: Blood from Arm, Right Updated: 07/09/23 1012     WBC 15.59 Thousand/uL      RBC 5.36 Million/uL      Hemoglobin 16.9 g/dL      Hematocrit 48.1 %      MCV 90 fL      MCH 31.5 pg      MCHC 35.1 g/dL      RDW 11.6 %      MPV 9.4 fL      Platelets 810 Thousands/uL      nRBC 0 /100 WBCs      Neutrophils Relative 76 %      Immat GRANS % 1 %      Lymphocytes Relative 12 %      Monocytes Relative 10 %      Eosinophils Relative 0 %      Basophils Relative 1 %      Neutrophils Absolute 11.94 Thousands/µL      Immature Grans Absolute 0.09 Thousand/uL      Lymphocytes Absolute 1.92 Thousands/µL      Monocytes Absolute 1.48 Thousand/µL      Eosinophils Absolute 0.05 Thousand/µL      Basophils Absolute 0.11 Thousands/µL                  CTA ED chest PE Study   Final Result by Suki Stanley MD (07/09 1127)   No PE. Workstation performed: [id number]         XR shoulder 2+ views LEFT   Final Result by Tennille Torres MD (07/09 1052)      No acute osseous abnormality   Calcific tendinitis supraspinatus.       Workstation performed: [id number]                    Procedures  ECG 12 Lead Documentation Only    Date/Time: 7/9/2023 10:09 AM    Performed by: Jay Watts MD  Authorized by: Jay Watts MD    Comments:      Atrial fibrillation with RVR rate of 151, normal axis, no acute ST elevations or depressions    CriticalCare Time    Date/Time: 7/9/2023 4:00 PM    Performed by: Jay Watts MD  Authorized by: Jay Watts MD    Critical care provider statement:     Critical care time (minutes):  42    Critical care was necessary to treat or prevent imminent or life-threatening deterioration of the following conditions: Arrhythmia requiring multiple IV doses of antiarrhythmic for control. ED Course                               SBIRT 22yo+    Flowsheet Row Most Recent Value   Initial Alcohol Screen: US AUDIT-C     1. How often do you have a drink containing alcohol? 0 Filed at: 07/09/2023 0950   2. How many drinks containing alcohol do you have on a typical day you are drinking? 0 Filed at: 07/09/2023 0950   3b. FEMALE Any Age, or MALE 65+: How often do you have 4 or more drinks on one occassion? 0 Filed at: 07/09/2023 0950   Audit-C Score 0 Filed at: 07/09/2023 7331   HUBERT: How many times in the past year have you. .. Used an illegal drug or used a prescription medication for non-medical reasons? Never Filed at: 07/09/2023 9205                    [facility]  Patient presents with left shoulder pain, found to be in atrial fibrillation with RVR. Shoulder x-ray shows calcific tendinitis, CTA PE obtained due to recent surgery which was negative. Have treated with IV diltiazem, have started oral diltiazem with controlled heart rate, will admit   Disposition  Final diagnoses:   Atrial fibrillation with RVR (HCC)   Left shoulder pain     Time reflects when diagnosis was documented in both MDM as applicable and the Disposition within this note     Time User Action Codes Description Comment    7/9/2023 12:14 PM Guera Rodriguez Add [I48.91] Atrial fibrillation with RVR ([address])     7/9/2023 12:14 PM Guera Rodriguez Add [Y90.305] Left shoulder pain       ED Disposition     ED Disposition   Admit    Condition   Stable    Date/Time   Sun Jul 9, 2023 12:14 PM    Comment   Case was discussed with Dr. John Santos and the patient's admission status was agreed to be Admission Status: observation status to the service of Dr. John Santos .            Follow-up Information    None         Patient's Medications   Discharge Prescriptions    No medications on file       No discharge procedures on file.     PDMP Review     None          ED Provider  Electronically Signed by           Vazquez Galloway MD  07/09/23 8083

## 2023-07-09 NOTE — ASSESSMENT & PLAN NOTE
· Patient also presenting with wrist pain  · Concerning for gout although denies history  · We will check uric acid, ESR and CRP

## 2023-07-09 NOTE — ASSESSMENT & PLAN NOTE
Patient presenting for evaluation of left shoulder pain, found to be on A-fib with RVR  Patient was given 90 mg of IV Cardizem with improvement in heart rate  We will start metoprolol tartrate 25 mg twice daily  [id number] score is 3 with a 3.2% of her stroke risk per year   Patient had umbilical hernia repair on 6/28  Discussed with patient risk and benefits of starting anticoagulation, patient agreeable to start heparin drip.    Echocardiogram was ordered  Check TSH, check Lyme titers (history of tick bite recently)   Cardiology consult placed, appreciate recommendations

## 2023-07-09 NOTE — ASSESSMENT & PLAN NOTE
· Patient presenting for evaluation of left shoulder pain, found to be on A-fib with RVR  · metoprolol tartrate 25 mg twice daily  · [id number] score is 3 with a 3.2% of her stroke risk per year   · Patient had umbilical hernia repair on 6/28  · Discussed with patient risk and benefits of starting anticoagulation, patient agreeable to start heparin drip. · Echocardiogram-EF 60%; mitral valve with moderate size mobile echodensity seen on anterior leaflet which could be leaflet thickening versus vegetation.   BONY recommended  · will price check Eliquis  · Cardiology following  · Follow-up Lyme titer   · Continue to monitor

## 2023-07-09 NOTE — H&P
[address]  H&P  Name: Haylee Uriarte 74 y.o. male I MRN: [id number]  Unit/Bed#: ED 32 I Date of Admission: 7/9/2023   Date of Service: 7/9/2023 I Hospital Day: 0      Assessment/Plan   * New onset atrial fibrillation [facility])  Assessment & Plan  Patient presenting for evaluation of left shoulder pain, found to be on A-fib with RVR  Patient was given 90 mg of IV Cardizem with improvement in heart rate  We will start metoprolol tartrate 25 mg twice daily  [id number] score is 3 with a 3.2% of her stroke risk per year   Patient had umbilical hernia repair on 6/28  Discussed with patient risk and benefits of starting anticoagulation, patient agreeable to start heparin drip. Echocardiogram was ordered  Check TSH, check Lyme titers (history of tick bite recently)   Cardiology consult placed, appreciate recommendations    Wrist pain  Assessment & Plan  Patient also presenting with wrist pain  Concerning for gout although denies history  We will check uric acid, ESR and CRP      Hypertension  Assessment & Plan  History of hypertension, patient taking lisinopril at home, will hold for now and start metoprolol     Acute pain of left shoulder  Assessment & Plan  Patient presenting for evaluation of pain in his shoulder  X-ray with calcific tendinitis  Does have history of tick bites, concern for Lyme arthritis we will start doxycycline 100 mg. Lidocaine patch and Tylenol as needed ordered  Patient states he wants a second opinion, orthopedic surgery was consulted    Abnormal LFTs  Assessment & Plan  In the setting of dehydration, uncontrolled diabetes  Check again in a.m.       Hyponatremia  Assessment & Plan  In the setting of uncontrolled diabetes, corrected sodium was 133  Patient received 1 L bolus in the emergency department, will hold further fluids for now   Urine lites ordered  Monitor kidney function         Diabetes mellitus [facility])  Assessment & Plan  Lab Results   Component Value Date HGBA1C 9.8 (H) 04/13/2023       No results for input(s): "POCGLU" in the last 72 hours. Blood Sugar Average: Last 72 hrs:  Hemoglobin A1c not at goal, patient states he stopped taking metformin due to side effects (was feeling weak, lightheaded)   Start SSI and frequent Accu-Cheks  Hypoglycemia protocol         VTE Pharmacologic Prophylaxis:   Moderate Risk (Score 3-4) - Pharmacological DVT Prophylaxis Ordered: heparin drip. Code Status: Level 1 - Full Code   Discussion with family: Updated  (significant other) at bedside. Anticipated Length of Stay: Patient will be admitted on an observation basis with an anticipated length of stay of less than 2 midnights secondary to New onset A-fib, uncontrolled type 2 diabetes. Total Time Spent on Date of Encounter in care of patient: 55 minutes This time was spent on one or more of the following: performing physical exam; counseling and coordination of care; obtaining or reviewing history; documenting in the medical record; reviewing/ordering tests, medications or procedures; communicating with other healthcare professionals and discussing with patient's family/caregivers. Chief Complaint: Left shoulder pain    History of Present Illness:  Maria Eugenia Santoyo is a 74 y.o. male with a PMH of hypertension, diabetes, nicotine dependence who presented to the emergency department for evaluation of left shoulder pain, during work-up he was found to be on A-fib with RVR. Patient was given IV diltiazem with improvement in heart rate. No previous history of A-fib. Patient will be admitted for further management and work-up of new onset A-fib. Review of Systems:  Review of Systems   Constitutional: Positive for fatigue. Respiratory: Negative for shortness of breath. Cardiovascular: Positive for leg swelling. Negative for chest pain. Endocrine: Positive for polyuria. Musculoskeletal: Positive for arthralgias. Neurological: Positive for dizziness. Psychiatric/Behavioral: Negative for sleep disturbance. All other systems reviewed and are negative. Past Medical and Surgical History:   Past Medical History:   Diagnosis Date   • Diabetes mellitus ([address])    • Hypertension    • Meniere's disease        Past Surgical History:   Procedure Laterality Date   • HERNIA REPAIR         Meds/Allergies:  Prior to Admission medications    Medication Sig Start Date End Date Taking? Authorizing Provider   lifitegrast [employer]) 5 % op solution 2 (two) times a day 4/4/23  Yes Historical Provider, MD   lisinopril (ZESTRIL) 20 mg tablet Take 20 mg by mouth daily 6/7/23 6/6/24 Yes Historical Provider, MD     I have reviewed home medications with patient personally. Allergies: No Known Allergies    Social History:  Marital Status: /Civil Union   Occupation:   Patient Pre-hospital Living Situation: Home  Patient Pre-hospital Level of Mobility: walks  Patient Pre-hospital Diet Restrictions:   Substance Use History:   Social History     Substance and Sexual Activity   Alcohol Use Never     Social History     Tobacco Use   Smoking Status Some Days   • Types: Cigarettes   Smokeless Tobacco Never     Social History     Substance and Sexual Activity   Drug Use Never       Family History:  History reviewed. No pertinent family history. Physical Exam:     Vitals:   Blood Pressure: 142/72 (07/09/23 1324)  Pulse: 97 (07/09/23 1324)  Temperature: 98.6 °F (37 °C) (07/09/23 0954)  Temp Source: Oral (07/09/23 0950)  Respirations: 20 (07/09/23 1324)  Weight - Scale: 92 kg (202 lb 13.2 oz) (07/09/23 1006)  SpO2: 93 % (07/09/23 1324)    Physical Exam  Vitals reviewed. Constitutional:       General: He is not in acute distress. Appearance: He is not toxic-appearing or diaphoretic. HENT:      Head: Normocephalic. Nose: Nose normal.      Mouth/Throat:      Mouth: Mucous membranes are moist.   Eyes:      General: No scleral icterus.   Cardiovascular:      Rate and Rhythm: Tachycardia present. Rhythm irregular. Heart sounds: No murmur heard. Pulmonary:      Breath sounds: Normal breath sounds. Abdominal:      General: There is no distension. Tenderness: There is no abdominal tenderness. Musculoskeletal:      Right lower leg: No edema. Left lower leg: No edema. Skin:     General: Skin is warm. Neurological:      Mental Status: He is alert. Mental status is at baseline. Psychiatric:         Mood and Affect: Mood normal.         Additional Data:     Lab Results:  Results from last 7 days   Lab Units 07/09/23  1003   WBC Thousand/uL 15.59*   HEMOGLOBIN g/dL 16.9   HEMATOCRIT % 48.1   PLATELETS Thousands/uL 250   NEUTROS PCT % 76*   LYMPHS PCT % 12*   MONOS PCT % 10   EOS PCT % 0     Results from last 7 days   Lab Units 07/09/23  1003   SODIUM mmol/L 128*   POTASSIUM mmol/L 4.6   CHLORIDE mmol/L 99   CO2 mmol/L 19*   BUN mg/dL 18   CREATININE mg/dL 1.06   ANION GAP mmol/L 10   CALCIUM mg/dL 9.5   ALBUMIN g/dL 4.1   TOTAL BILIRUBIN mg/dL 1.31*   ALK PHOS U/L 118*   ALT U/L 14   AST U/L 9*   GLUCOSE RANDOM mg/dL 403*     Results from last 7 days   Lab Units 07/09/23  1359   INR  1.12             Results from last 7 days   Lab Units 07/09/23  1003   LACTIC ACID mmol/L 1.1       Lines/Drains:  Invasive Devices     Peripheral Intravenous Line  Duration           Peripheral IV 07/09/23 Distal;Right;Upper;Ventral (anterior) Arm <1 day                    Imaging: Reviewed radiology reports from this admission including: xray(s)  CTA ED chest PE Study   Final Result by Percy Recinos MD (07/09 1127)   No PE. Workstation performed: [id number]         XR shoulder 2+ views LEFT   Final Result by Brian Yuan MD (07/09 1052)      No acute osseous abnormality   Calcific tendinitis supraspinatus.       Workstation performed: [id number]             EKG and Other Studies Reviewed on Admission:   · EKG: A fib     ** Please Note: This note has been constructed using a voice recognition system.  **

## 2023-07-10 ENCOUNTER — APPOINTMENT (OUTPATIENT)
Dept: NON INVASIVE DIAGNOSTICS | Facility: HOSPITAL | Age: 68
DRG: 868 | End: 2023-07-10
Payer: COMMERCIAL

## 2023-07-10 LAB
ALBUMIN SERPL BCP-MCNC: 3.6 G/DL (ref 3.5–5)
ALP SERPL-CCNC: 96 U/L (ref 34–104)
ALT SERPL W P-5'-P-CCNC: 10 U/L (ref 7–52)
ANION GAP SERPL CALCULATED.3IONS-SCNC: 11 MMOL/L
AORTIC ROOT: 3.5 CM
AORTIC VALVE MEAN VELOCITY: 13.4 M/S
APICAL FOUR CHAMBER EJECTION FRACTION: 61 %
APTT PPP: 36 SECONDS (ref 23–37)
APTT PPP: 40 SECONDS (ref 23–37)
ASCENDING AORTA: 3.7 CM
AST SERPL W P-5'-P-CCNC: 6 U/L (ref 13–39)
AV LVOT MEAN GRADIENT: 4 MMHG
AV LVOT PEAK GRADIENT: 7 MMHG
AV MEAN GRADIENT: 8 MMHG
AV PEAK GRADIENT: 14 MMHG
AV REGURGITATION PRESSURE HALF TIME: 198 MS
AV VELOCITY RATIO: 0.68
BASOPHILS # BLD AUTO: 0.08 THOUSANDS/ÂΜL (ref 0–0.1)
BASOPHILS NFR BLD AUTO: 1 % (ref 0–1)
BILIRUB DIRECT SERPL-MCNC: 0.24 MG/DL (ref 0–0.2)
BILIRUB SERPL-MCNC: 0.87 MG/DL (ref 0.2–1)
BUN SERPL-MCNC: 18 MG/DL (ref 5–25)
CALCIUM SERPL-MCNC: 9.8 MG/DL (ref 8.4–10.2)
CHLORIDE SERPL-SCNC: 103 MMOL/L (ref 96–108)
CO2 SERPL-SCNC: 23 MMOL/L (ref 21–32)
CREAT SERPL-MCNC: 0.95 MG/DL (ref 0.6–1.3)
DOP CALC AO PEAK VEL: 1.9 M/S
DOP CALC AO VTI: 34.99 CM
DOP CALC LVOT PEAK VEL VTI: 29.14 CM
DOP CALC LVOT PEAK VEL: 1.29 M/S
E WAVE DECELERATION TIME: 91 MS
EOSINOPHIL # BLD AUTO: 0.12 THOUSAND/ÂΜL (ref 0–0.61)
EOSINOPHIL NFR BLD AUTO: 1 % (ref 0–6)
ERYTHROCYTE [DISTWIDTH] IN BLOOD BY AUTOMATED COUNT: 11.6 % (ref 11.6–15.1)
ERYTHROCYTE [SEDIMENTATION RATE] IN BLOOD: 32 MM/HOUR (ref 0–19)
FRACTIONAL SHORTENING: 38 % (ref 28–44)
GFR SERPL CREATININE-BSD FRML MDRD: 83 ML/MIN/1.73SQ M
GLUCOSE SERPL-MCNC: 173 MG/DL (ref 65–140)
GLUCOSE SERPL-MCNC: 195 MG/DL (ref 65–140)
GLUCOSE SERPL-MCNC: 209 MG/DL (ref 65–140)
GLUCOSE SERPL-MCNC: 233 MG/DL (ref 65–140)
GLUCOSE SERPL-MCNC: 238 MG/DL (ref 65–140)
HCT VFR BLD AUTO: 45.5 % (ref 36.5–49.3)
HGB BLD-MCNC: 15.5 G/DL (ref 12–17)
IMM GRANULOCYTES # BLD AUTO: 0.06 THOUSAND/UL (ref 0–0.2)
IMM GRANULOCYTES NFR BLD AUTO: 1 % (ref 0–2)
INTERVENTRICULAR SEPTUM IN DIASTOLE (PARASTERNAL SHORT AXIS VIEW): 1.1 CM
INTERVENTRICULAR SEPTUM: 1.1 CM (ref 0.6–1.1)
LAAS-AP2: 25.1 CM2
LAAS-AP4: 17.9 CM2
LEFT ATRIUM AREA SYSTOLE SINGLE PLANE A4C: 19 CM2
LEFT ATRIUM SIZE: 4.6 CM
LEFT ATRIUM VOLUME (MOD BIPLANE): 65 ML
LEFT INTERNAL DIMENSION IN SYSTOLE: 3.2 CM (ref 2.1–4)
LEFT VENTRICULAR INTERNAL DIMENSION IN DIASTOLE: 5.2 CM (ref 3.5–6)
LEFT VENTRICULAR POSTERIOR WALL IN END DIASTOLE: 1 CM
LEFT VENTRICULAR STROKE VOLUME: 89 ML
LVSV (TEICH): 89 ML
LYMPHOCYTES # BLD AUTO: 2.11 THOUSANDS/ÂΜL (ref 0.6–4.47)
LYMPHOCYTES NFR BLD AUTO: 17 % (ref 14–44)
MCH RBC QN AUTO: 30.9 PG (ref 26.8–34.3)
MCHC RBC AUTO-ENTMCNC: 34.1 G/DL (ref 31.4–37.4)
MCV RBC AUTO: 91 FL (ref 82–98)
MONOCYTES # BLD AUTO: 1.03 THOUSAND/ÂΜL (ref 0.17–1.22)
MONOCYTES NFR BLD AUTO: 8 % (ref 4–12)
MV E'TISSUE VEL-SEP: 9 CM/S
MV PEAK A VEL: 0.32 M/S
MV PEAK E VEL: 101 CM/S
MV STENOSIS PRESSURE HALF TIME: 26 MS
MV VALVE AREA P 1/2 METHOD: 8.46 CM2
NEUTROPHILS # BLD AUTO: 9.27 THOUSANDS/ÂΜL (ref 1.85–7.62)
NEUTS SEG NFR BLD AUTO: 72 % (ref 43–75)
NRBC BLD AUTO-RTO: 0 /100 WBCS
PHOSPHATE SERPL-MCNC: 2.8 MG/DL (ref 2.3–4.1)
PLATELET # BLD AUTO: 207 THOUSANDS/UL (ref 149–390)
PMV BLD AUTO: 9.2 FL (ref 8.9–12.7)
POTASSIUM SERPL-SCNC: 4.5 MMOL/L (ref 3.5–5.3)
PROCALCITONIN SERPL-MCNC: 0.16 NG/ML
PROT SERPL-MCNC: 6.7 G/DL (ref 6.4–8.4)
RBC # BLD AUTO: 5.01 MILLION/UL (ref 3.88–5.62)
RIGHT ATRIUM AREA SYSTOLE A4C: 13.4 CM2
RIGHT VENTRICLE ID DIMENSION: 3.1 CM
SL CV AV DECELERATION TIME RETROGRADE: 683 MS
SL CV AV PEAK GRADIENT RETROGRADE: 91 MMHG
SL CV LEFT ATRIUM LENGTH A2C: 6 CM
SL CV PED ECHO LEFT VENTRICLE DIASTOLIC VOLUME (MOD BIPLANE) 2D: 131 ML
SL CV PED ECHO LEFT VENTRICLE SYSTOLIC VOLUME (MOD BIPLANE) 2D: 42 ML
SODIUM SERPL-SCNC: 137 MMOL/L (ref 135–147)
TRICUSPID ANNULAR PLANE SYSTOLIC EXCURSION: 1.8 CM
WBC # BLD AUTO: 12.67 THOUSAND/UL (ref 4.31–10.16)

## 2023-07-10 PROCEDURE — 85730 THROMBOPLASTIN TIME PARTIAL: CPT | Performed by: INTERNAL MEDICINE

## 2023-07-10 PROCEDURE — 87040 BLOOD CULTURE FOR BACTERIA: CPT | Performed by: INTERNAL MEDICINE

## 2023-07-10 PROCEDURE — 93306 TTE W/DOPPLER COMPLETE: CPT

## 2023-07-10 PROCEDURE — 99223 1ST HOSP IP/OBS HIGH 75: CPT | Performed by: INTERNAL MEDICINE

## 2023-07-10 PROCEDURE — 84145 PROCALCITONIN (PCT): CPT | Performed by: INTERNAL MEDICINE

## 2023-07-10 PROCEDURE — 93306 TTE W/DOPPLER COMPLETE: CPT | Performed by: INTERNAL MEDICINE

## 2023-07-10 PROCEDURE — 99231 SBSQ HOSP IP/OBS SF/LOW 25: CPT

## 2023-07-10 PROCEDURE — 80076 HEPATIC FUNCTION PANEL: CPT | Performed by: INTERNAL MEDICINE

## 2023-07-10 PROCEDURE — 99233 SBSQ HOSP IP/OBS HIGH 50: CPT | Performed by: INTERNAL MEDICINE

## 2023-07-10 PROCEDURE — 82948 REAGENT STRIP/BLOOD GLUCOSE: CPT

## 2023-07-10 PROCEDURE — 84100 ASSAY OF PHOSPHORUS: CPT | Performed by: INTERNAL MEDICINE

## 2023-07-10 PROCEDURE — 80048 BASIC METABOLIC PNL TOTAL CA: CPT | Performed by: INTERNAL MEDICINE

## 2023-07-10 PROCEDURE — 85025 COMPLETE CBC W/AUTO DIFF WBC: CPT | Performed by: INTERNAL MEDICINE

## 2023-07-10 PROCEDURE — 85652 RBC SED RATE AUTOMATED: CPT | Performed by: INTERNAL MEDICINE

## 2023-07-10 RX ADMIN — ACETAMINOPHEN 975 MG: 325 TABLET, FILM COATED ORAL at 14:19

## 2023-07-10 RX ADMIN — DICLOFENAC SODIUM 2 G: 10 GEL TOPICAL at 12:47

## 2023-07-10 RX ADMIN — DOXYCYCLINE 100 MG: 100 CAPSULE ORAL at 21:10

## 2023-07-10 RX ADMIN — INSULIN LISPRO 1 UNITS: 100 INJECTION, SOLUTION INTRAVENOUS; SUBCUTANEOUS at 21:10

## 2023-07-10 RX ADMIN — METOROPROLOL TARTRATE 5 MG: 5 INJECTION, SOLUTION INTRAVENOUS at 03:17

## 2023-07-10 RX ADMIN — APIXABAN 5 MG: 5 TABLET, FILM COATED ORAL at 17:17

## 2023-07-10 RX ADMIN — DOXYCYCLINE 100 MG: 100 CAPSULE ORAL at 08:26

## 2023-07-10 RX ADMIN — INSULIN LISPRO 2 UNITS: 100 INJECTION, SOLUTION INTRAVENOUS; SUBCUTANEOUS at 08:27

## 2023-07-10 RX ADMIN — HEPARIN SODIUM 4000 UNITS: 1000 INJECTION INTRAVENOUS; SUBCUTANEOUS at 05:40

## 2023-07-10 RX ADMIN — INSULIN LISPRO 1 UNITS: 100 INJECTION, SOLUTION INTRAVENOUS; SUBCUTANEOUS at 17:17

## 2023-07-10 RX ADMIN — INSULIN LISPRO 3 UNITS: 100 INJECTION, SOLUTION INTRAVENOUS; SUBCUTANEOUS at 12:43

## 2023-07-10 RX ADMIN — DICLOFENAC SODIUM 2 G: 10 GEL TOPICAL at 10:00

## 2023-07-10 RX ADMIN — DICLOFENAC SODIUM 2 G: 10 GEL TOPICAL at 21:10

## 2023-07-10 RX ADMIN — METOPROLOL TARTRATE 25 MG: 25 TABLET, FILM COATED ORAL at 21:10

## 2023-07-10 RX ADMIN — ACETAMINOPHEN 975 MG: 325 TABLET, FILM COATED ORAL at 21:09

## 2023-07-10 RX ADMIN — HEPARIN SODIUM 19.1 UNITS/KG/HR: 10000 INJECTION, SOLUTION INTRAVENOUS at 09:46

## 2023-07-10 RX ADMIN — METOPROLOL TARTRATE 25 MG: 25 TABLET, FILM COATED ORAL at 08:27

## 2023-07-10 RX ADMIN — HEPARIN SODIUM 4000 UNITS: 1000 INJECTION INTRAVENOUS; SUBCUTANEOUS at 14:13

## 2023-07-10 NOTE — ASSESSMENT & PLAN NOTE
· Patient presenting for evaluation of left shoulder pain, found to be on A-fib with RVR  · metoprolol tartrate 50mg 3 times daily  · [id number] score is 3 with a 3.2% of her stroke risk per year   · Patient had umbilical hernia repair on 6/28  · Patient started on Eliquis after price check  · Echocardiogram-EF 60%; mitral valve with moderate size mobile echodensity seen on anterior leaflet which could be leaflet thickening versus vegetation  · BONY read pending  · Cardiology following  · Follow-up Lyme titer   · Continue to monitor

## 2023-07-10 NOTE — PROGRESS NOTES
Progress Note - Orthopedics   David Caraballo 74 y.o. male MRN: [id number]  Unit/Bed#: [facility] ECHO      Subjective:    74 y. o.male with multiple joint pain. Patient is laying comfortably in hospital bed this morning in no acute distress. Patient reports he is feeling significantly better today compared to yesterday with minimal pain in left shoulder and left wrist. Patient states he is receiving doxycycline and topical voltaren, which he feels has helped relieve his pain. Patient feels he is able to move his arm with more ease compared to yesterday. Patient offers no additional complaints.      Labs:  0   Lab Value Date/Time    HCT 45.5 07/10/2023 0453    HCT 48.1 07/09/2023 1003    HGB 15.5 07/10/2023 0453    HGB 16.9 07/09/2023 1003    INR 1.12 07/09/2023 1359    WBC 12.67 (H) 07/10/2023 0453    WBC 15.59 (H) 07/09/2023 1003    ESR 32 (H) 07/10/2023 0843    .1 (H) 07/09/2023 1337       Meds:    Current Facility-Administered Medications:   •  acetaminophen (TYLENOL) tablet 975 mg, 975 mg, Oral, Q8H [address], Gi Moore MD, 975 mg at 07/09/23 2127  •  Diclofenac Sodium (VOLTAREN) 1 % topical gel 2 g, 2 g, Topical, 4x Daily, Justo Toscano MD, 2 g at 07/09/23 2134  •  doxycycline hyclate (VIBRAMYCIN) capsule 100 mg, 100 mg, Oral, Q12H [address], Justo Toscano MD, 100 mg at 07/10/23 4771  •  heparin (porcine) 25,000 units in 0.45% NaCl 250 mL infusion (premix), 3-20 Units/kg/hr (Order-Specific), Intravenous, Titrated, Justo Toscano MD, Last Rate: 17.2 mL/hr at 07/10/23 0946, 19.1 Units/kg/hr at 07/10/23 0946  •  heparin (porcine) injection 2,000 Units, 2,000 Units, Intravenous, Q6H PRN, Justo Toscano MD  •  heparin (porcine) injection 4,000 Units, 4,000 Units, Intravenous, Q6H PRN, Justo Toscano MD, 4,000 Units at 07/10/23 0540  •  insulin lispro (HumaLOG) 100 units/mL subcutaneous injection 1-6 Units, 1-6 Units, Subcutaneous, TID AC, 2 Units at 07/10/23 0827 **AND** Fingerstick Glucose (POCT), , , TID AC, Chelo Gao MD  •  insulin lispro (HumaLOG) 100 units/mL subcutaneous injection 1-6 Units, 1-6 Units, Subcutaneous, HS, Chelo Gao MD, 2 Units at 07/09/23 2132  •  lidocaine (LIDODERM) 5 % patch 1 patch, 1 patch, Topical, Daily, Chelo Gao MD, 1 patch at 07/09/23 1337  •  metoprolol (LOPRESSOR) injection 5 mg, 5 mg, Intravenous, Q6H PRN, Chelo Gao MD, 5 mg at 07/10/23 0317  •  metoprolol tartrate (LOPRESSOR) tablet 25 mg, 25 mg, Oral, Q12H [address], Chelo Gao MD, 25 mg at 07/10/23 0827  •  naloxone (NARCAN) 0.04 mg/mL syringe 0.04 mg, 0.04 mg, Intravenous, Q1MIN PRN, Chelo Gao MD  •  ondansetron (ZOFRAN) injection 4 mg, 4 mg, Intravenous, Q4H PRN, Chelo Gao MD  •  oxyCODONE (ROXICODONE) IR tablet 5 mg, 5 mg, Oral, Q4H PRN, Chelo Gao MD, 5 mg at 07/09/23 1718  •  oxyCODONE (ROXICODONE) split tablet 2.5 mg, 2.5 mg, Oral, Q4H PRN, Chelo Gao MD  •  senna-docusate sodium (SENOKOT S) 8.6-50 mg per tablet 1 tablet, 1 tablet, Oral, HS, Chelo Gao MD    Blood Culture:   No results found for: "BLOODCX"    Wound Culture:   No results found for: "WOUNDCULT"    Ins and Outs:  I/O last 24 hours: In: 8061 [I.V.:841; IV Piggyback:2000]  Out: 2000 [Urine:2000]          Physical:  Vitals:    07/10/23 1100   BP:    Pulse:    Resp:    Temp: 98.2 °F (36.8 °C)   SpO2:      Musculoskeletal: left Upper Extremity  · Skin intact without evidence of erythema or ecchymosis. Mild swelling noted around left wrist. IV placed on dorsum of hand. · Mild TTP over anterior shoulder. No other bony or soft tissue tenderness to palpation appreciated. · Able to perform full active range of motion in left shoulder with minimal pain at terminal endpoints  · Full AROM of elbow  · Wrist active range of motion from from 50 degrees of extension to 70 degrees flexion without significant pain.    · Sensation intact to median/radial/ulnar nerve distribution   · Motor intact anterior interosseous nerve/posterior interosseous nerve/median/radial/ulnar nerve distributions  · 2+ radial pulse  · Digits warm and well perfused  · Capillary refill < 2 seconds      Assessment:    74 y. o.male with Left shoulder pain, likely cellulitis, possible bug/tick/spider bite, multiple joint complaints     Plan:  • Weightbearing as tolerated Left upper extremity  • PT/OT- encourage shoulder range of motion  • Apply ice as needed to left shoulder  • Pain control per primary team  • DVT ppx per primary team  • Continue antibiotics per SLIM or ID  • Dispo: Patient has showed significant improvement in symptoms since admission. Low concern for acute joint infection at this time. No plan for glenohumeral CSI secondary to multiple joint complaints and patient's history of diabetes. No immediate orthopedic interventions planned at this time. If there are any further questions or concerns, please reach out. Ortho signing off.  See above for additional details.        Rose Mary Vitale PA-C

## 2023-07-10 NOTE — PROGRESS NOTES
[address]  Progress Note  Name: Guillermo Oseguera  MRN: [id number]  Unit/Bed#:  I Date of Admission: 7/9/2023   Date of Service: 7/10/2023 I Hospital Day: 0    Assessment/Plan   * New onset atrial fibrillation [facility])  Assessment & Plan  · Patient presenting for evaluation of left shoulder pain, found to be on A-fib with RVR  · metoprolol tartrate 25 mg twice daily  · [id number] score is 3 with a 3.2% of her stroke risk per year   · Patient had umbilical hernia repair on 6/28  · Discussed with patient risk and benefits of starting anticoagulation, patient agreeable to start heparin drip. · Echocardiogram-EF 60%; mitral valve with moderate size mobile echodensity seen on anterior leaflet which could be leaflet thickening versus vegetation. BONY recommended  · will price check Eliquis  · Cardiology following  · Follow-up Lyme titer   · Continue to monitor    Wrist pain  Assessment & Plan  · Patient also presenting with wrist pain  · Concerning for gout although denies history  · We will check uric acid, ESR and CRP      Hypertension  Assessment & Plan  History of hypertension, patient taking lisinopril at home, will hold for now and start metoprolol     Acute pain of left shoulder  Assessment & Plan  Patient presenting for evaluation of pain in his shoulder  X-ray with calcific tendinitis  Does have history of tick bites, concern for Lyme arthritis we will start doxycycline 100 mg. Lidocaine patch and Tylenol as needed ordered  Patient states he wants a second opinion, orthopedic surgery was consulted    Abnormal LFTs  Assessment & Plan  Stable      Hyponatremia  Assessment & Plan  Resolved at this time    Diabetes mellitus [facility])  Assessment & Plan  Lab Results   Component Value Date    HGBA1C 9.8 (H) 04/13/2023       No results for input(s): "POCGLU" in the last 72 hours.     Blood Sugar Average: Last 72 hrs:  · Hemoglobin A1c not at goal, patient states he stopped taking metformin due to side effects (was feeling weak, lightheaded)   · Correctional scale insulin  · Hypoglycemia protocol             VTE Pharmacologic Prophylaxis:   Moderate Risk (Score 3-4) - Pharmacological DVT Prophylaxis Ordered: heparin drip. Patient Centered Rounds: I performed bedside rounds with nursing staff today. Discussions with Specialists or Other Care Team Provider: Cardiology, case management    Education and Discussions with Family / Patient: Updated  (wife) at bedside. Total Time Spent on Date of Encounter in care of patient: 42 minutes This time was spent on one or more of the following: performing physical exam; counseling and coordination of care; obtaining or reviewing history; documenting in the medical record; reviewing/ordering tests, medications or procedures; communicating with other healthcare professionals and discussing with patient's family/caregivers. Current Length of Stay: 0 day(s)  Current Patient Status: Observation   Certification Statement: The patient will continue to require additional inpatient hospital stay due to Echodensity noted on echocardiogram  Discharge Plan: Anticipate discharge in 48 hrs to home. Code Status: Level 1 - Full Code    Subjective:   Patient resting comfortably on examination. Patient complaining of shoulder pain but denied any other complaints on exam.    Objective:     Vitals:   Temp (24hrs), Av.3 °F (36.8 °C), Min:98.1 °F (36.7 °C), Max:98.4 °F (36.9 °C)    Temp:  [98.1 °F (36.7 °C)-98.4 °F (36.9 °C)] 98.2 °F (36.8 °C)  HR:  [] 94  Resp:  [19-33] 22  BP: (140-180)/(74-89) 169/81  SpO2:  [90 %-94 %] 93 %  Body mass index is 27.53 kg/m². Input and Output Summary (last 24 hours): Intake/Output Summary (Last 24 hours) at 7/10/2023 1406  Last data filed at 7/10/2023 0601  Gross per 24 hour   Intake 841.01 ml   Output 2000 ml   Net -1158.99 ml       Physical Exam:   Physical Exam  Vitals and nursing note reviewed. Constitutional:       General: He is not in acute distress. Appearance: He is well-developed. HENT:      Head: Normocephalic and atraumatic. Eyes:      General: No scleral icterus. Conjunctiva/sclera: Conjunctivae normal.   Cardiovascular:      Rate and Rhythm: Normal rate and regular rhythm. Heart sounds: Normal heart sounds. No murmur heard. No friction rub. No gallop. Pulmonary:      Effort: Pulmonary effort is normal. No respiratory distress. Breath sounds: Normal breath sounds. No wheezing or rales. Abdominal:      General: Bowel sounds are normal. There is no distension. Palpations: Abdomen is soft. Tenderness: There is no abdominal tenderness. Musculoskeletal:         General: Normal range of motion. Skin:     General: Skin is warm. Findings: No rash. Neurological:      Mental Status: He is alert and oriented to person, place, and time.           Additional Data:     Labs:  Results from last 7 days   Lab Units 07/10/23  0453   WBC Thousand/uL 12.67*   HEMOGLOBIN g/dL 15.5   HEMATOCRIT % 45.5   PLATELETS Thousands/uL 207   NEUTROS PCT % 72   LYMPHS PCT % 17   MONOS PCT % 8   EOS PCT % 1     Results from last 7 days   Lab Units 07/10/23  0843 07/10/23  0453   SODIUM mmol/L 137  --    POTASSIUM mmol/L 4.5  --    CHLORIDE mmol/L 103  --    CO2 mmol/L 23  --    BUN mg/dL 18  --    CREATININE mg/dL 0.95  --    ANION GAP mmol/L 11  --    CALCIUM mg/dL 9.8  --    ALBUMIN g/dL  --  3.6   TOTAL BILIRUBIN mg/dL  --  0.87   ALK PHOS U/L  --  96   ALT U/L  --  10   AST U/L  --  6*   GLUCOSE RANDOM mg/dL 233*  --      Results from last 7 days   Lab Units 07/09/23  1359   INR  1.12     Results from last 7 days   Lab Units 07/10/23  1136 07/10/23  0746 07/09/23  2059 07/09/23  1704   POC GLUCOSE mg/dl 238* 209* 200* 325*         Results from last 7 days   Lab Units 07/10/23  0843 07/09/23  1003   LACTIC ACID mmol/L  --  1.1   PROCALCITONIN ng/ml 0.16  -- Lines/Drains:  Invasive Devices     Peripheral Intravenous Line  Duration           Peripheral IV 07/09/23 Distal;Right;Upper;Ventral (anterior) Arm 1 day    Peripheral IV 07/09/23 Dorsal (posterior); Left Hand <1 day                      Imaging: No pertinent imaging reviewed. Recent Cultures (last 7 days):         Last 24 Hours Medication List:   Current Facility-Administered Medications   Medication Dose Route Frequency Provider Last Rate   • acetaminophen  975 mg Oral Q8H 151 Epi Ramos MD     • Diclofenac Sodium  2 g Topical 4x Daily Tommy Sagastume MD     • doxycycline hyclate  100 mg Oral Q12H 151 Epi Ramos MD     • heparin (porcine)  3-20 Units/kg/hr (Order-Specific) Intravenous Titrated Tommy Sagastume MD 19.1 Units/kg/hr (07/10/23 0946)   • heparin (porcine)  2,000 Units Intravenous Q6H PRN Tommy Sagastume MD     • heparin (porcine)  4,000 Units Intravenous Q6H PRN Tommy Sagastume MD     • insulin lispro  1-6 Units Subcutaneous TID AC Tommy Sagastume MD     • insulin lispro  1-6 Units Subcutaneous HS Tommy Sagastume MD     • lidocaine  1 patch Topical Daily Tommy Sagastume MD     • metoprolol  5 mg Intravenous Q6H PRN Tommy Sagastume MD     • metoprolol tartrate  25 mg Oral Q12H 151 Epi Ramos MD     • naloxone  0.04 mg Intravenous Q1MIN PRN Tommy Sagastume MD     • ondansetron  4 mg Intravenous Q4H PRN Tommy Sagastume MD     • oxyCODONE  5 mg Oral Q4H PRN oTmmy Sagastume MD     • oxyCODONE  2.5 mg Oral Q4H PRN Tommy Sagastume MD     • senna-docusate sodium  1 tablet Oral HS Tommy Sagastume MD          Today, Patient Was Seen By: Tiago Glasgow DO    **Please Note: This note may have been constructed using a voice recognition system. **

## 2023-07-10 NOTE — CASE MANAGEMENT
Case Management Discharge Planning Note    Patient name Wanda Melgoza  Location / MRN [id number]  : 1955 Date 7/10/2023       Current Admission Date: 2023  Current Admission Diagnosis:New onset atrial fibrillation [facility])   Patient Active Problem List    Diagnosis Date Noted   • Wrist pain 2023   • Diabetes mellitus ([address])    • Hyponatremia    • Abnormal LFTs    • New onset atrial fibrillation ([address])    • Acute pain of left shoulder    • Hypertension       LOS (days): 0  Geometric Mean LOS (GMLOS) (days):   Days to GMLOS:     OBJECTIVE:            Current admission status: Observation   Preferred Pharmacy:   [facility]/pharmacy [address] [facility], [address] [place] [address] [address]  [address] [place] [address] [address]  Phone: [phone] Fax: [phone]    Primary Care Provider: Amanuel Win MD    Primary Insurance: MEDICARE  Secondary Insurance: Felicia Giordano    DISCHARGE DETAILS:    Other Referral/Resources/Interventions Provided:  Interventions: Prescription Price Check  Referral Comments: CM called [facility] in [facility] at [phone] and spoke to Chandrakant. Chandrakant reported that Vivien will have a $21 copay, but she did not receive the script.  MALIA informed SVETLANA of the same via TT.

## 2023-07-10 NOTE — CONSULTS
Consultation - Cardiology   Maria Eugenia Santoyo 74 y.o. male MRN: [id number]  Unit/Bed#:  Encounter: [id number]  07/10/23  3:15 PM    Assessment/ Plan:  1. New onset atrial fibillation  - Patient found to be in atrial fibrillation with RVR upon presentation to the ER for evaluation of L shoulder and R wrist pain  - Patient in NSR this am.  - Echo today revealed EF 60% with mild to moderate aortic valve regurgitation and moderate-sized mobile echodensity on mitral valve which could be leaflet thickening vs vegetation. -BONY ordered to further evaluate mitral valve abnormality. Possible cardioversion at that time if patient converts back to a fib. -TSH 7/9 normal. Pending Lyme titer. Current possible cellulitis per orthopedics. -[id number]  - Continue tele   - Continue metoprolol.  - Discontinue heparin and initiate Eliquis; price checked and reasonable    2. Mitral valve abnormality noted on echo  -moderate size mobile echodensity on mitral valve which could be leaflet thickening versus vegetation.    -Discussed need for BONY for further evaluation. Risk and benefits reviewed. Patient was advised about 1% risk for esophageal perforation and anesthesia complication. Patient is agreeable. N.p.o. at midnight. Plan for this tomorrow. Further evaluation will be based upon results of BONY. 3. Hypertension  -Continue metoprolol  - Continue to monitor    4. Type 2 Diabetes Mellitus  -Last HgbA1C 9.8  -Continue management per primary team      History of Present Illness   Physician Requesting Consult: Ludmila San*    Reason for Consult / Principal Problem: New onset atrial fibrillation    HPI: Maria Eugenia Santoyo is a 74y.o. year old male who presents with new onset atrial fibrillation. Patient presented to ER with L shoulder and R wrist pain s/p possible spider/tick bite, and was found to be in atrial fibrillation with RVR in the ER. He denies prior history of atrial fibrillation.  Patient states he occasionally gets lightheaded walking up stairs, but otherwise denies recent chest pain, palpitations, dizziness, fatigue, SOB, leg pain or swelling, or fevers or chills. He denies history of heart attack, stroke, COPD, sleep apnea, or thyroid disorders. He states he had recent umbilical hernia repair on 6/28. He states his father had atrial fibrillation and received a pacemaker for it. Patient states he smokes about 0.25ppd cigarettes for the past 10 years, uses alcohol infrequently, and smokes marijuana about once weekly, and denies other drug use. Inpatient consult to Cardiology  Consult performed by: Olegario Cedillo PA-C  Consult ordered by: Dorothy Acuna MD          EKG: Atrial fibrillation w RVR      Review of Systems   Constitutional: Negative for chills, fatigue and fever. Respiratory: Negative for shortness of breath. Cardiovascular: Negative for chest pain, palpitations and leg swelling. Gastrointestinal: Negative for abdominal pain, diarrhea, nausea and vomiting. Genitourinary: Negative for difficulty urinating and dysuria. Musculoskeletal: Positive for arthralgias (Pain in L shoulder, R wrist) and joint swelling (R wrist). Neurological: Positive for light-headedness (Occassional with stairs). Negative for dizziness. Historical Information   Past Medical History:   Diagnosis Date   • Diabetes mellitus ([address])    • Hypertension    • Meniere's disease      Past Surgical History:   Procedure Laterality Date   • HERNIA REPAIR       Social History     Substance and Sexual Activity   Alcohol Use Never     Social History     Substance and Sexual Activity   Drug Use Never     Social History     Tobacco Use   Smoking Status Some Days   • Types: Cigarettes   Smokeless Tobacco Never       Family History: History reviewed. No pertinent family history.     Meds/Allergies   all current active meds have been reviewed  No Known Allergies    Objective   Vitals: Blood pressure 169/81, pulse 94, temperature 98.1 °F (36.7 °C), temperature source Oral, resp. rate 22, height 6' (1.829 m), weight 92.1 kg (203 lb), SpO2 93 %. , Body mass index is 27.53 kg/m².,   Orthostatic Blood Pressures    Flowsheet Row Most Recent Value   Blood Pressure 169/81 filed at 07/10/2023 0827   Patient Position - Orthostatic VS Sitting filed at 07/10/2023 5130          Systolic (26MPF), IZY:453 , Min:140 , LX     Diastolic (91WZV), OAT:33, Min:74, Max:89        Intake/Output Summary (Last 24 hours) at 7/10/2023 1515  Last data filed at 7/10/2023 0601  Gross per 24 hour   Intake 841.01 ml   Output 2000 ml   Net -1158.99 ml       Invasive Devices     Peripheral Intravenous Line  Duration           Peripheral IV 23 Distal;Right;Upper;Ventral (anterior) Arm 1 day    Peripheral IV 23 Dorsal (posterior); Left Hand <1 day                    Physical Exam:  GEN: Alert and oriented x 3, in no acute distress. Well appearing and well nourished. HEENT: Sclera anicteric, conjunctivae pink, mucous membranes moist. Oropharynx clear. NECK: Supple, no carotid bruits, no significant JVD. Trachea midline, no thyromegaly. HEART: Regular rhythm, normal S1 and S2, no murmurs, clicks, gallops or rubs. PMI nondisplaced, no thrills. LUNGS: Clear to auscultation bilaterally; no wheezes, rales, or rhonchi. No increased work of breathing or signs of respiratory distress. ABDOMEN: Soft, nontender, nondistended, normoactive bowel sounds. EXTREMITIES: Skin warm and well perfused, no clubbing, cyanosis, or edema. NEURO: No focal findings. Normal speech. Mood and affect normal.   SKIN: Normal without suspicious lesions on exposed skin.       Lab Results:     Troponins:   Results from last 7 days   Lab Units 23  1359 23  1213 23  1003   HS TNI 0HR ng/L  --   --  6   HS TNI 2HR ng/L  --  5  --    HS TNI 4HR ng/L 4  --   --    HSTNI D4 ng/L -2  --   --        CBC with diff:   Results from last 7 days   Lab Units 07/10/23  0453 07/09/23  1003   WBC Thousand/uL 12.67* 15.59*   HEMOGLOBIN g/dL 15.5 16.9   HEMATOCRIT % 45.5 48.1   MCV fL 91 90   PLATELETS Thousands/uL 207 250   RBC Million/uL 5.01 5.36   MCH pg 30.9 31.5   MCHC g/dL 34.1 35.1   RDW % 11.6 11.6   MPV fL 9.2 9.4   NRBC AUTO /100 WBCs 0 0         CMP:   Results from last 7 days   Lab Units 07/10/23  0843 07/10/23  0453 07/09/23  1003   POTASSIUM mmol/L 4.5  --  4.6   CHLORIDE mmol/L 103  --  99   CO2 mmol/L 23  --  19*   BUN mg/dL 18  --  18   CREATININE mg/dL 0.95  --  1.06   CALCIUM mg/dL 9.8  --  9.5   AST U/L  --  6* 9*   ALT U/L  --  10 14   ALK PHOS U/L  --  96 118*   EGFR ml/min/1.73sq m 83  --  73

## 2023-07-10 NOTE — ASSESSMENT & PLAN NOTE
Lab Results   Component Value Date    HGBA1C 9.8 (H) 04/13/2023       Recent Labs     07/09/23  1704 07/09/23 2059 07/10/23  0746 07/10/23  1136   POCGLU 325* 200* 209* 238*       Blood Sugar Average: Last 72 hrs:  · Hemoglobin A1c not at goal, patient states he stopped taking metformin due to side effects (was feeling weak, lightheaded)   · Correctional scale insulin  · Hypoglycemia protocol

## 2023-07-10 NOTE — CASE MANAGEMENT
Case Management Progress Note    Patient name Kerwin Nelson  Location / MRN [id number]  : 1955 Date 7/10/2023       LOS (days): 0  Geometric Mean LOS (GMLOS) (days):   Days to GMLOS:        OBJECTIVE:        Current admission status: Observation  Preferred Pharmacy:   [facility] [address] [place] [address], [address] [place] [address] [address]  Phone: [phone] Fax: [phone]    Primary Care Provider: Eloise Mcelroy MD    Primary Insurance: MEDICARE  Secondary Insurance: [employer]    PROGRESS NOTE:    Per chart review, patient was independent prior to admission, he has insurance, and is being followed by a PCP. Patient is on IV abx and pain control. He has an echo pending and is on a heparin gtt. He is anticipated to be discharged home today vs. 24 hours. No CM needs anticipated at this time. CM department will continue to follow patient through discharge.

## 2023-07-11 ENCOUNTER — APPOINTMENT (OUTPATIENT)
Dept: NON INVASIVE DIAGNOSTICS | Facility: HOSPITAL | Age: 68
DRG: 868 | End: 2023-07-11
Payer: COMMERCIAL

## 2023-07-11 LAB
GLUCOSE SERPL-MCNC: 177 MG/DL (ref 65–140)
GLUCOSE SERPL-MCNC: 224 MG/DL (ref 65–140)
GLUCOSE SERPL-MCNC: 257 MG/DL (ref 65–140)

## 2023-07-11 PROCEDURE — 93312 ECHO TRANSESOPHAGEAL: CPT

## 2023-07-11 PROCEDURE — 99232 SBSQ HOSP IP/OBS MODERATE 35: CPT | Performed by: INTERNAL MEDICINE

## 2023-07-11 PROCEDURE — 93312 ECHO TRANSESOPHAGEAL: CPT | Performed by: INTERNAL MEDICINE

## 2023-07-11 PROCEDURE — 93325 DOPPLER ECHO COLOR FLOW MAPG: CPT | Performed by: INTERNAL MEDICINE

## 2023-07-11 PROCEDURE — 93320 DOPPLER ECHO COMPLETE: CPT | Performed by: INTERNAL MEDICINE

## 2023-07-11 PROCEDURE — 99233 SBSQ HOSP IP/OBS HIGH 50: CPT | Performed by: INTERNAL MEDICINE

## 2023-07-11 PROCEDURE — B246ZZ4 ULTRASONOGRAPHY OF RIGHT AND LEFT HEART, TRANSESOPHAGEAL: ICD-10-PCS | Performed by: INTERNAL MEDICINE

## 2023-07-11 PROCEDURE — 82948 REAGENT STRIP/BLOOD GLUCOSE: CPT

## 2023-07-11 RX ORDER — DILTIAZEM HYDROCHLORIDE 5 MG/ML
15 INJECTION INTRAVENOUS ONCE
Status: COMPLETED | OUTPATIENT
Start: 2023-07-11 | End: 2023-07-11

## 2023-07-11 RX ORDER — METOPROLOL TARTRATE 50 MG/1
50 TABLET, FILM COATED ORAL EVERY 8 HOURS
Status: DISCONTINUED | OUTPATIENT
Start: 2023-07-11 | End: 2023-07-12

## 2023-07-11 RX ORDER — SODIUM CHLORIDE, SODIUM LACTATE, POTASSIUM CHLORIDE, CALCIUM CHLORIDE 600; 310; 30; 20 MG/100ML; MG/100ML; MG/100ML; MG/100ML
INJECTION, SOLUTION INTRAVENOUS CONTINUOUS PRN
Status: DISCONTINUED | OUTPATIENT
Start: 2023-07-11 | End: 2023-07-11

## 2023-07-11 RX ORDER — METOPROLOL TARTRATE 5 MG/5ML
5 INJECTION INTRAVENOUS ONCE
Status: COMPLETED | OUTPATIENT
Start: 2023-07-11 | End: 2023-07-11

## 2023-07-11 RX ORDER — PROPOFOL 10 MG/ML
INJECTION, EMULSION INTRAVENOUS AS NEEDED
Status: DISCONTINUED | OUTPATIENT
Start: 2023-07-11 | End: 2023-07-11

## 2023-07-11 RX ADMIN — SODIUM CHLORIDE, SODIUM LACTATE, POTASSIUM CHLORIDE, AND CALCIUM CHLORIDE: .6; .31; .03; .02 INJECTION, SOLUTION INTRAVENOUS at 08:41

## 2023-07-11 RX ADMIN — INSULIN LISPRO 1 UNITS: 100 INJECTION, SOLUTION INTRAVENOUS; SUBCUTANEOUS at 22:04

## 2023-07-11 RX ADMIN — LIDOCAINE 5% 1 PATCH: 700 PATCH TOPICAL at 09:53

## 2023-07-11 RX ADMIN — DILTIAZEM HYDROCHLORIDE 15 MG: 5 INJECTION INTRAVENOUS at 05:19

## 2023-07-11 RX ADMIN — DICLOFENAC SODIUM 2 G: 10 GEL TOPICAL at 09:54

## 2023-07-11 RX ADMIN — APIXABAN 5 MG: 5 TABLET, FILM COATED ORAL at 09:52

## 2023-07-11 RX ADMIN — METOROPROLOL TARTRATE 5 MG: 5 INJECTION, SOLUTION INTRAVENOUS at 00:49

## 2023-07-11 RX ADMIN — ACETAMINOPHEN 975 MG: 325 TABLET, FILM COATED ORAL at 05:13

## 2023-07-11 RX ADMIN — PROPOFOL 40 MG: 10 INJECTION, EMULSION INTRAVENOUS at 08:47

## 2023-07-11 RX ADMIN — INSULIN LISPRO 3 UNITS: 100 INJECTION, SOLUTION INTRAVENOUS; SUBCUTANEOUS at 17:47

## 2023-07-11 RX ADMIN — METOPROLOL TARTRATE 50 MG: 50 TABLET, FILM COATED ORAL at 20:49

## 2023-07-11 RX ADMIN — METOROPROLOL TARTRATE 5 MG: 5 INJECTION, SOLUTION INTRAVENOUS at 03:57

## 2023-07-11 RX ADMIN — METOPROLOL TARTRATE 25 MG: 25 TABLET, FILM COATED ORAL at 09:52

## 2023-07-11 RX ADMIN — PROPOFOL 40 MG: 10 INJECTION, EMULSION INTRAVENOUS at 08:45

## 2023-07-11 RX ADMIN — INSULIN LISPRO 2 UNITS: 100 INJECTION, SOLUTION INTRAVENOUS; SUBCUTANEOUS at 11:52

## 2023-07-11 RX ADMIN — APIXABAN 5 MG: 5 TABLET, FILM COATED ORAL at 20:48

## 2023-07-11 RX ADMIN — METOROPROLOL TARTRATE 5 MG: 5 INJECTION, SOLUTION INTRAVENOUS at 10:28

## 2023-07-11 RX ADMIN — PROPOFOL 40 MG: 10 INJECTION, EMULSION INTRAVENOUS at 08:50

## 2023-07-11 RX ADMIN — DOXYCYCLINE 100 MG: 100 CAPSULE ORAL at 09:52

## 2023-07-11 RX ADMIN — PROPOFOL 80 MG: 10 INJECTION, EMULSION INTRAVENOUS at 08:44

## 2023-07-11 RX ADMIN — DOXYCYCLINE 100 MG: 100 CAPSULE ORAL at 20:48

## 2023-07-11 RX ADMIN — PROPOFOL 40 MG: 10 INJECTION, EMULSION INTRAVENOUS at 08:54

## 2023-07-11 RX ADMIN — METOPROLOL TARTRATE 50 MG: 50 TABLET, FILM COATED ORAL at 11:51

## 2023-07-11 NOTE — PROGRESS NOTES
Cardiology Progress Note - Kerwin Nelson 74 y.o. male MRN: [id number]    Unit/Bed#: -01 Encounter: [id number]      Assessment/Plan:  1. New onset atrial fibrillation  · Patient found to be in atrial fibrillation with RVR upon presentation to the ER for evaluation of L shoulder and R wrist pain. · TTE yesterday revealed EF 60%. BONY today revealed EF 60%, no cardioversion completed. · TSH 7/9 normal. Pending Lyme titer. · [id number] of 3  · Patient has been in and out of afib with RVR since admission  · Episode of tachycardia to the 110s-120s overnight, given PRN metoprolol and one time dose IV cardizem  · Patient in atrial fibrillation this am, tachycardic in the 120s-130s  · Continue tele  · Increased metoprolol to 50mg TID  · Continue Eliquis    2. Mitral valve abnormality on TTE  · TTE 7/10 revealed EF 60% moderate sized mobile echodensity on mitral valve, leaflet thickening vs vegetation  · BONY today revealed EF 60%. 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 3. Hypertension  · /83 this am  · Increased metoprolol to 50mg TID  · Continue to monitor    4. Type 2 Diabetes Mellitus  · Last HgbA1C 9.8  · Continue management per primary team        Subjective:   Patient seen and examined. Episode of tachycardia to the 110s-120s overnight, patient given IV metoprolol and IV cardizem. Patient states this episode occurred when he got up and walked to the bathroom, and denies symptoms during this overnight episode. Continues to convert between afib w RVR and NSR. Patient denies chest pain, palpitations, SOB, or syncopal episodes. Patient states he is slightly lightheaded and dizzy but felt this was related to the BONY and sedation as he denies experiencing these symptoms before the BONY.      Objective:     Vitals: Blood pressure 143/80, pulse 77, temperature 97.9 °F (36.6 °C), resp. rate 18, height 6' (1.829 m), weight 92.1 kg (203 lb), SpO2 95 %. , Body mass index is 27.53 kg/m².,   Orthostatic Blood Pressures    Flowsheet Row Most Recent Value   Blood Pressure 143/80 filed at 07/11/2023 1514   Patient Position - Orthostatic VS Lying filed at 07/10/2023 1947            Intake/Output Summary (Last 24 hours) at 7/11/2023 1515  Last data filed at 7/11/2023 1101  Gross per 24 hour   Intake 227.39 ml   Output 500 ml   Net -272.61 ml         Physical Exam:    GEN: Nancy Torres appears well, alert and oriented x 3, pleasant and cooperative   HEENT: pupils equal, round, and reactive to light; extraocular muscles intact  NECK: supple   HEART: irregularly irregular rhythm, tachycardic, normal S1 and S2, no murmurs, clicks, gallops or rubs   LUNGS: clear to auscultation bilaterally; no wheezes, rales, or rhonchi   ABDOMEN: normal bowel sounds, soft, no tenderness, no distention  EXTREMITIES: peripheral pulses normal; no clubbing, cyanosis, or edema      Medications:      Current Facility-Administered Medications:   •  [MAR Hold] acetaminophen (TYLENOL) tablet 975 mg, 975 mg, Oral, Q8H [address], Gi Moore MD, 975 mg at 07/11/23 0513  •  [MAR Hold] apixaban (ELIQUIS) tablet 5 mg, 5 mg, Oral, BID, Jaye Bishop PA-C, 5 mg at 07/11/23 9225  •  [MAR Hold] Diclofenac Sodium (VOLTAREN) 1 % topical gel 2 g, 2 g, Topical, 4x Daily, Gi Moore MD, 2 g at 07/11/23 0954  •  [MAR Hold] doxycycline hyclate (VIBRAMYCIN) capsule 100 mg, 100 mg, Oral, Q12H [address], Gi Moore MD, 100 mg at 07/11/23 0952  •  [MAR Hold] insulin lispro (HumaLOG) 100 units/mL subcutaneous injection 1-6 Units, 1-6 Units, Subcutaneous, TID AC, 2 Units at 07/11/23 1152 **AND** Fingerstick Glucose (POCT), , , TID AC, Gi Moore MD  •  [MAR Hold] insulin lispro (HumaLOG) 100 units/mL subcutaneous injection 1-6 Units, 1-6 Units, Subcutaneous, HS, Ladarius Rankin MD, 1 Units at 07/10/23 2110  •  lidocaine (LIDODERM) 5 % patch 1 patch, 1 patch, Topical, Daily, Heather Jennings MD, 1 patch at 07/11/23 0953  •  [MAR Hold] metoprolol (LOPRESSOR) injection 5 mg, 5 mg, Intravenous, Q6H PRN, Heather Jennings MD, 5 mg at 07/11/23 1028  •  [MAR Hold] metoprolol tartrate (LOPRESSOR) tablet 50 mg, 50 mg, Oral, Q8H, Jaye Bishop PA-C, 50 mg at 07/11/23 1151  •  [MAR Hold] naloxone (NARCAN) 0.04 mg/mL syringe 0.04 mg, 0.04 mg, Intravenous, Q1MIN PRN, Heather Jennings MD  •  [MAR Hold] ondansetron (ZOFRAN) injection 4 mg, 4 mg, Intravenous, Q4H PRN, Heather Jennings MD  •  [MAR Hold] oxyCODONE (ROXICODONE) IR tablet 5 mg, 5 mg, Oral, Q4H PRN, Heather Jennings MD, 5 mg at 07/09/23 1718  •  [MAR Hold] oxyCODONE (ROXICODONE) split tablet 2.5 mg, 2.5 mg, Oral, Q4H PRN, Heather Jennings MD  •  [facility] Hold] senna-docusate sodium (SENOKOT S) 8.6-50 mg per tablet 1 tablet, 1 tablet, Oral, HS, Heather Jennings MD     Labs & Results:    Results from last 7 days   Lab Units 07/09/23  1359 07/09/23  1213 07/09/23  1003   HS TNI 0HR ng/L  --   --  6   HS TNI 2HR ng/L  --  5  --    HS TNI 4HR ng/L 4  --   --    HSTNI D4 ng/L -2  --   --      Results from last 7 days   Lab Units 07/10/23  0453 07/09/23  1003   WBC Thousand/uL 12.67* 15.59*   HEMOGLOBIN g/dL 15.5 16.9   HEMATOCRIT % 45.5 48.1   PLATELETS Thousands/uL 207 250         Results from last 7 days   Lab Units 07/10/23  0843 07/10/23  0453 07/09/23  1003   POTASSIUM mmol/L 4.5  --  4.6   CHLORIDE mmol/L 103  --  99   CO2 mmol/L 23  --  19*   BUN mg/dL 18  --  18   CREATININE mg/dL 0.95  --  1.06   CALCIUM mg/dL 9.8  --  9.5   ALK PHOS U/L  --  96 118*   ALT U/L  --  10 14   AST U/L  --  6* 9*     Results from last 7 days   Lab Units 07/10/23  1240 07/10/23  0453 07/09/23  1943 07/09/23  1359 07/09/23  1003   INR   --   --   --  1.12 1.12   PTT seconds 40* 36 29 27 28

## 2023-07-11 NOTE — NURSING NOTE
Pt transferring to [facility], report given to Tim Flores RN. All belongings and medications sent with pt. Pt transported via wheelchair.

## 2023-07-11 NOTE — ANESTHESIA POSTPROCEDURE EVALUATION
Post-Op Assessment Note    CV Status:  Stable    Pain management: adequate     Mental Status:  Alert and awake   Hydration Status:  Euvolemic   PONV Controlled:  Controlled   Airway Patency:  Patent      Post Op Vitals Reviewed: Yes            No notable events documented.     /72 (07/11/23 0900)    Temp 98 °F (36.7 °C) (07/11/23 0900)    Pulse (!) 124 (07/11/23 0900)   Resp 20 (07/11/23 0900)    SpO2 96 % (07/11/23 0900)

## 2023-07-11 NOTE — CASE MANAGEMENT
Case Management Assessment & Discharge Planning Note    Patient name Wanda Melgoza  Location / MRN [id number]  : 1955 Date 2023       Current Admission Date: 2023  Current Admission Diagnosis:New onset atrial fibrillation [facility])   Patient Active Problem List    Diagnosis Date Noted   • Wrist pain 2023   • Diabetes mellitus ([address])    • Hyponatremia    • Abnormal LFTs    • New onset atrial fibrillation ([address])    • Acute pain of left shoulder    • Hypertension       LOS (days): 1  Geometric Mean LOS (GMLOS) (days): 3.20  Days to GMLOS:2.3     OBJECTIVE:    Risk of Unplanned Readmission Score: 10.64      Current admission status: Inpatient     Preferred Pharmacy:   [facility] [address] [place] [address] Drive  Phone: [phone] Fax: [phone]    Primary Care Provider: Amanuel Win MD    Primary Insurance: [facility]icia Giordano [facility]  Secondary Insurance:     ASSESSMENT:  [address] Representative - Spouse   Primary Phone: [phone] (Home)               Advance Directives  Does patient have a [address]?: No  Was patient offered paperwork?: Yes  Does patient currently have a Health Care decision maker?: Yes, please see Health Care Proxy section  Does patient have Advance Directives?: No  Was patient offered paperwork?: Yes  Primary Contact: Raji Yuen    Readmission Root Cause  30 Day Readmission: No    Patient Information  Admitted from[de-identified] Home  Mental Status: Alert  During Assessment patient was accompanied by: Spouse  Assessment information provided by[de-identified] Patient, Spouse  Primary Caregiver: Self  Support Systems: Self, Spouse/significant other  [place] of Residence: [address] [place] [address] do you live in?: [employer] entry access options.  Select all that apply.: Stairs  Number of steps to enter home.: 4  Do the steps have railings?: Yes  Type of Current Residence: [address] home  Upon entering residence, is there a bedroom on the main floor (no further steps)?: Yes  Upon entering residence, is there a bathroom on the main floor (no further steps)?: Yes  In the last 12 months, was there a time when you were not able to pay the mortgage or rent on time?: No  In the last 12 months, was there a time when you did not have a steady place to sleep or slept in a shelter (including now)?: No  Living Arrangements: Lives w/ Spouse/significant other    Activities of Daily Living Prior to Admission  Functional Status: Independent  Completes ADLs independently?: Yes  Ambulates independently?: Yes  Does patient use assisted devices?: No  Does patient currently own DME?: No  Does patient have a history of Outpatient Therapy (PT/OT)?: Yes  Does the patient have a history of Short-Term Rehab?: No  Does patient have a history of HHC?: No  Does patient currently have [address]?: No    Patient Information Continued  Income Source: Pension/[employer]  Does patient have prescription coverage?: Yes  Within the past 12 months, you worried that your food would run out before you got the money to buy more.: Never true  Within the past 12 months, the food you bought just didn't last and you didn't have money to get more.: Never true  Food insecurity resource given?: No  Does patient receive dialysis treatments?: No  Does patient have a history of substance abuse?: No  Does patient have a history of Mental Health Diagnosis?: No    PHQ 2/9 Screening   Reviewed PHQ 2/9 Depression Screening Score?: No    Means of Transportation  Means of Transport to Appts[de-identified] Drives Self  In the past 12 months, has lack of transportation kept you from medical appointments or from getting medications?: No  In the past 12 months, has lack of transportation kept you from meetings, work, or from getting things needed for daily living?: No  Was application for public transport provided?: No    DISCHARGE DETAILS:    Discharge planning discussed with[de-identified] Patient and Wife - Bridget Montes  Freedom of Choice: Yes     CM contacted family/caregiver?: No- see comments (Wife at bedside)  Were Treatment Team discharge recommendations reviewed with patient/caregiver?: Yes  Did patient/caregiver verbalize understanding of patient care needs?: Yes  Were patient/caregiver advised of the risks associated with not following Treatment Team discharge recommendations?: Yes    Contacts  Patient Contacts: Bridget Montes  Relationship to Patient[de-identified] Family  Contact Method: Phone  Phone Number: [phone]  Reason/Outcome: Continuity of Care, Discharge [address] [facility]         Is the patient interested in [facility] at discharge?: No    DME Referral Provided  Referral made for DME?: No    Would you like to participate in our [address] service program?  : No - Declined    Transport at Discharge : Automobile     IMM Given (Date):: 07/11/23  IMM Given to[de-identified] Patient     Additional Comments: Reviewed IMM, signed copy placed in chart.     Estella Zamora RN Case Manager

## 2023-07-11 NOTE — ASSESSMENT & PLAN NOTE
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

## 2023-07-11 NOTE — ASSESSMENT & PLAN NOTE
· Denies any complaint on exam at this time  · X-ray with calcific tendinitis  · Does have history of tick bites, concern for Lyme arthritis we will start doxycycline 100 mg.   · Lidocaine patch and Tylenol as needed ordered

## 2023-07-11 NOTE — ASSESSMENT & PLAN NOTE
Lab Results   Component Value Date    HGBA1C 9.8 (H) 04/13/2023       Recent Labs     07/10/23  1136 07/10/23  1613 07/10/23  2057 07/11/23  1123   POCGLU 238* 173* 195* 224*       Blood Sugar Average: Last 72 hrs:  · Given metformin extended release on discharge  · Endocrinology referral placed for discharge

## 2023-07-11 NOTE — PROGRESS NOTES
[address]  Progress Note  Name: Mariam Lugo  MRN: [id number]  Unit/Bed#:  I Date of Admission: 7/9/2023   Date of Service: 7/11/2023 I Hospital Day: 1    Assessment/Plan   * New onset atrial fibrillation [facility])  Assessment & Plan  · Patient presenting for evaluation of left shoulder pain, found to be on A-fib with RVR  · metoprolol tartrate 25 mg twice daily  · [id number] score is 3 with a 3.2% of her stroke risk per year   · Patient had umbilical hernia repair on 6/28  · Discussed with patient risk and benefits of starting anticoagulation, patient agreeable to start heparin drip. · Echocardiogram-EF 60%; mitral valve with moderate size mobile echodensity seen on anterior leaflet which could be leaflet thickening versus vegetation. BONY recommended  · will price check Eliquis  · Cardiology following  · Follow-up Lyme titer   · Continue to monitor    Abnormal LFTs  Assessment & Plan  Stable      Hyponatremia  Assessment & Plan  Resolved at this time    Diabetes mellitus [facility])  Assessment & Plan  Lab Results   Component Value Date    HGBA1C 9.8 (H) 04/13/2023       Recent Labs     07/09/23  1704 07/09/23  2059 07/10/23  0746 07/10/23  1136   POCGLU 325* 200* 209* 238*       Blood Sugar Average: Last 72 hrs:  · Hemoglobin A1c not at goal, patient states he stopped taking metformin due to side effects (was feeling weak, lightheaded)   · Correctional scale insulin  · Hypoglycemia protocol               VTE Pharmacologic Prophylaxis:   Moderate Risk (Score 3-4) - Pharmacological DVT Prophylaxis Ordered: apixaban (Eliquis). Patient Centered Rounds: I performed bedside rounds with nursing staff today. Discussions with Specialists or Other Care Team Provider: Cardiology, case management    Education and Discussions with Family / Patient: Updated  (wife) at bedside.     Total Time Spent on Date of Encounter in care of patient: 38 minutes This time was spent on one or more of the following: performing physical exam; counseling and coordination of care; obtaining or reviewing history; documenting in the medical record; reviewing/ordering tests, medications or procedures; communicating with other healthcare professionals and discussing with patient's family/caregivers. Current Length of Stay: 1 day(s)  Current Patient Status: Inpatient   Certification Statement: The patient will continue to require additional inpatient hospital stay due to A-fib RVR  Discharge Plan: Anticipate discharge tomorrow to home. Code Status: Level 1 - Full Code    Subjective:   Patient resting comfortably on examination. Patient still eager to go home again today but discussed with patient that his heart rates are still uncontrolled and need to adjust his medications. Patient did go into A-fib RVR overnight last night and again during BONY today. Objective:     Vitals:   Temp (24hrs), Av.1 °F (36.7 °C), Min:98 °F (36.7 °C), Max:98.3 °F (36.8 °C)    Temp:  [98 °F (36.7 °C)-98.3 °F (36.8 °C)] 98 °F (36.7 °C)  HR:  [] 111  Resp:  [20-33] 27  BP: (120-181)/(63-97) 170/74  SpO2:  [92 %-96 %] 94 %  Body mass index is 27.53 kg/m². Input and Output Summary (last 24 hours): Intake/Output Summary (Last 24 hours) at 2023 1112  Last data filed at 2023 0301  Gross per 24 hour   Intake 704.5 ml   Output 500 ml   Net 204.5 ml       Physical Exam:   Physical Exam  Vitals and nursing note reviewed. Constitutional:       General: He is not in acute distress. Appearance: He is well-developed. HENT:      Head: Normocephalic and atraumatic. Eyes:      General: No scleral icterus. Conjunctiva/sclera: Conjunctivae normal.   Cardiovascular:      Rate and Rhythm: Normal rate and regular rhythm. Heart sounds: Normal heart sounds. No murmur heard. No friction rub. No gallop. Pulmonary:      Effort: Pulmonary effort is normal. No respiratory distress.       Breath sounds: Normal breath sounds. No wheezing or rales. Abdominal:      General: Bowel sounds are normal. There is no distension. Palpations: Abdomen is soft. Tenderness: There is no abdominal tenderness. Musculoskeletal:         General: Normal range of motion. Skin:     General: Skin is warm. Findings: No rash. Neurological:      Mental Status: He is alert and oriented to person, place, and time. Additional Data:     Labs:  Results from last 7 days   Lab Units 07/10/23  0453   WBC Thousand/uL 12.67*   HEMOGLOBIN g/dL 15.5   HEMATOCRIT % 45.5   PLATELETS Thousands/uL 207   NEUTROS PCT % 72   LYMPHS PCT % 17   MONOS PCT % 8   EOS PCT % 1     Results from last 7 days   Lab Units 07/10/23  0843 07/10/23  0453   SODIUM mmol/L 137  --    POTASSIUM mmol/L 4.5  --    CHLORIDE mmol/L 103  --    CO2 mmol/L 23  --    BUN mg/dL 18  --    CREATININE mg/dL 0.95  --    ANION GAP mmol/L 11  --    CALCIUM mg/dL 9.8  --    ALBUMIN g/dL  --  3.6   TOTAL BILIRUBIN mg/dL  --  0.87   ALK PHOS U/L  --  96   ALT U/L  --  10   AST U/L  --  6*   GLUCOSE RANDOM mg/dL 233*  --      Results from last 7 days   Lab Units 07/09/23  1359   INR  1.12     Results from last 7 days   Lab Units 07/10/23  2057 07/10/23  1613 07/10/23  1136 07/10/23  0746 07/09/23  2059 07/09/23  1704   POC GLUCOSE mg/dl 195* 173* 238* 209* 200* 325*         Results from last 7 days   Lab Units 07/10/23  0843 07/09/23  1003   LACTIC ACID mmol/L  --  1.1   PROCALCITONIN ng/ml 0.16  --        Lines/Drains:  Invasive Devices     Peripheral Intravenous Line  Duration           Peripheral IV 07/09/23 Distal;Right;Upper;Ventral (anterior) Arm 2 days    Peripheral IV 07/09/23 Dorsal (posterior); Left Hand 1 day                      Imaging: No pertinent imaging reviewed. Recent Cultures (last 7 days):   Results from last 7 days   Lab Units 07/10/23  1239   BLOOD CULTURE  Received in Microbiology Lab. Culture in Progress. Received in Microbiology Lab. Culture in Progress. Last 24 Hours Medication List:   Current Facility-Administered Medications   Medication Dose Route Frequency Provider Last Rate   • acetaminophen  975 mg Oral Q8H 151 Epi Ramos MD     • apixaban  5 mg Oral BID Michael Chirinos PA-C     • Diclofenac Sodium  2 g Topical 4x Daily Angelo Patiño MD     • doxycycline hyclate  100 mg Oral Q12H 151 Epi Ramos MD     • insulin lispro  1-6 Units Subcutaneous TID AC Angelo Patiño MD     • insulin lispro  1-6 Units Subcutaneous HS Angelo Patiño MD     • lidocaine  1 patch Topical Daily Angelo Patiño MD     • metoprolol  5 mg Intravenous Q6H PRN Angelo Patiño MD     • metoprolol tartrate  50 mg Oral Q8H Jaye Bishop PA-C     • naloxone  0.04 mg Intravenous Q1MIN PRN Angelo Patiño MD     • ondansetron  4 mg Intravenous Q4H PRN Angelo Patiño MD     • oxyCODONE  5 mg Oral Q4H PRN Angelo Patiño MD     • oxyCODONE  2.5 mg Oral Q4H PRN Angelo Patiño MD     • senna-docusate sodium  1 tablet Oral HS Angelo Patiño MD          Today, Patient Was Seen By: Smitha Maria DO    **Please Note: This note may have been constructed using a voice recognition system. **

## 2023-07-11 NOTE — ASSESSMENT & PLAN NOTE
· Denies any complaint on exam at this time  · X-ray with calcific tendinitis  · Lyme still pending at this time  · Given marked improvement with doxycycline will treat with 14-day course for Lyme arthritis

## 2023-07-11 NOTE — ANESTHESIA PREPROCEDURE EVALUATION
Procedure:  BONY    Relevant Problems   CARDIO   (+) Hypertension   (+) New onset atrial fibrillation (HCC)        Physical Exam    Airway    Mallampati score: II  TM Distance: >3 FB  Neck ROM: full     Dental   No notable dental hx     Cardiovascular      Pulmonary      Other Findings        Anesthesia Plan  ASA Score- 3     Anesthesia Type- IV sedation with anesthesia with ASA Monitors. Additional Monitors:   Airway Plan:           Plan Factors-Exercise tolerance (METS): >4 METS. Chart reviewed. EKG reviewed. Existing labs reviewed. Patient summary reviewed. Patient is a current smoker. Patient instructed to abstain from smoking on day of procedure. Patient did not smoke on day of surgery. There is medical exclusion for perioperative obstructive sleep apnea risk education. Induction- intravenous. Postoperative Plan-     Informed Consent- Anesthetic plan and risks discussed with patient. I personally reviewed this patient with the CRNA. Discussed and agreed on the Anesthesia Plan with the CRNA. Jesika Rankin

## 2023-07-11 NOTE — QUICK NOTE
Nurse reached out as patient -120 's. Patient received PRN metoprolol at 0049 with good response and HR controlled. Patient not due for another dose until 0600. Will give a one time dose of IV metoprolol 5mg now for rate control and appeared reasonable response. Continue to monitor. ADDENDUM UPDATE: 0553  Patient showed no response to one time dose of IV metoprolol. Will give a one time dose of iv cardizem 15 mg IV.

## 2023-07-11 NOTE — DISCHARGE INSTRUCTIONS
Transesophageal Echocardiogram   WHAT YOU NEED TO KNOW:   A transesophageal echocardiogram (BONY) is a procedure used to check for problems with your heart. It will also show any problems in the blood vessels near your heart. Sound waves are sent to the heart through a tube inserted into your throat. The sound waves show the structure and function of your heart through pictures on a monitor. DISCHARGE INSTRUCTIONS:   Call your local emergency number (911 in the [address]) if:   You have any of the following signs of a heart attack:      Squeezing, pressure, or pain in your chest    You may  also have any of the following:     Discomfort or pain in your back, neck, jaw, stomach, or arm    Shortness of breath    Nausea or vomiting    Lightheadedness or a sudden cold sweat      Call your doctor or cardiologist if:   You have a fever or chills. You taste blood. You have a severe sore throat or trouble swallowing. You have questions or concerns about your condition or care. Do not eat or drink anything until you are able to swallow normally:  Start with soft foods, such as oatmeal, yogurt, or applesauce. When you can eat soft foods easily, you may begin to eat solid foods. What you can do to keep your heart healthy:   Eat heart-healthy foods. Eat whole grains, fruits, and vegetables every day. Limit salt and high-fat foods. Ask your healthcare provider for more information on a heart-healthy diet. Exercise as directed. Your healthcare provider may suggest an exercise program to help improve your heart health. It is best to start slowly, and do more as you get stronger. Do not start an exercise program without talking with your healthcare provider. Maintain a healthy weight. Keep a healthy weight so your heart does not have to work so hard. Ask what weight is healthy for you. Your healthcare provider can help you create a safe weight-loss plan, if needed. Manage your medical conditions.   High blood pressure, high cholesterol, and diabetes can lead to heart problems. Work with your healthcare provider to manage your medical conditions and decrease your risk for heart problems. Do not smoke. Nicotine and other chemicals in cigarettes and cigars can cause lung damage. Ask your healthcare provider for information if you currently smoke and need help to quit. E-cigarettes or smokeless tobacco still contain nicotine. Talk to your healthcare provider before you use these products. Follow up with your doctor or cardiologist as directed:  Write down your questions so you remember to ask them during your visits. © Copyright [employer] 2022 Information is for End User's use only and may not be sold, redistributed or otherwise used for commercial purposes. The above information is an  only. It is not intended as medical advice for individual conditions or treatments. Talk to your doctor, nurse or pharmacist before following any medical regimen to see if it is safe and effective for you.

## 2023-07-12 VITALS
TEMPERATURE: 98 F | RESPIRATION RATE: 18 BRPM | DIASTOLIC BLOOD PRESSURE: 68 MMHG | SYSTOLIC BLOOD PRESSURE: 147 MMHG | HEART RATE: 71 BPM | WEIGHT: 203 LBS | OXYGEN SATURATION: 95 % | BODY MASS INDEX: 27.5 KG/M2 | HEIGHT: 72 IN

## 2023-07-12 DIAGNOSIS — I48.91 ATRIAL FIBRILLATION WITH RVR (HCC): ICD-10-CM

## 2023-07-12 LAB
ATRIAL RATE: 375 BPM
BASOPHILS # BLD AUTO: 0.12 THOUSANDS/ÂΜL (ref 0–0.1)
BASOPHILS NFR BLD AUTO: 1 % (ref 0–1)
EOSINOPHIL # BLD AUTO: 0.22 THOUSAND/ÂΜL (ref 0–0.61)
EOSINOPHIL NFR BLD AUTO: 2 % (ref 0–6)
ERYTHROCYTE [DISTWIDTH] IN BLOOD BY AUTOMATED COUNT: 11.6 % (ref 11.6–15.1)
GLUCOSE SERPL-MCNC: 217 MG/DL (ref 65–140)
HCT VFR BLD AUTO: 44.8 % (ref 36.5–49.3)
HGB BLD-MCNC: 15.6 G/DL (ref 12–17)
IMM GRANULOCYTES # BLD AUTO: 0.04 THOUSAND/UL (ref 0–0.2)
IMM GRANULOCYTES NFR BLD AUTO: 0 % (ref 0–2)
LYMPHOCYTES # BLD AUTO: 3.23 THOUSANDS/ÂΜL (ref 0.6–4.47)
LYMPHOCYTES NFR BLD AUTO: 33 % (ref 14–44)
MCH RBC QN AUTO: 32.2 PG (ref 26.8–34.3)
MCHC RBC AUTO-ENTMCNC: 34.8 G/DL (ref 31.4–37.4)
MCV RBC AUTO: 93 FL (ref 82–98)
MONOCYTES # BLD AUTO: 0.91 THOUSAND/ÂΜL (ref 0.17–1.22)
MONOCYTES NFR BLD AUTO: 9 % (ref 4–12)
NEUTROPHILS # BLD AUTO: 5.41 THOUSANDS/ÂΜL (ref 1.85–7.62)
NEUTS SEG NFR BLD AUTO: 55 % (ref 43–75)
NRBC BLD AUTO-RTO: 0 /100 WBCS
PLATELET # BLD AUTO: 258 THOUSANDS/UL (ref 149–390)
PMV BLD AUTO: 9.5 FL (ref 8.9–12.7)
QRS AXIS: 31 DEGREES
QRSD INTERVAL: 62 MS
QT INTERVAL: 330 MS
QTC INTERVAL: 414 MS
RBC # BLD AUTO: 4.84 MILLION/UL (ref 3.88–5.62)
T WAVE AXIS: 8 DEGREES
VENTRICULAR RATE: 95 BPM
WBC # BLD AUTO: 9.93 THOUSAND/UL (ref 4.31–10.16)

## 2023-07-12 PROCEDURE — 93010 ELECTROCARDIOGRAM REPORT: CPT | Performed by: INTERNAL MEDICINE

## 2023-07-12 PROCEDURE — 82948 REAGENT STRIP/BLOOD GLUCOSE: CPT

## 2023-07-12 PROCEDURE — 99239 HOSP IP/OBS DSCHRG MGMT >30: CPT | Performed by: INTERNAL MEDICINE

## 2023-07-12 PROCEDURE — 85025 COMPLETE CBC W/AUTO DIFF WBC: CPT | Performed by: STUDENT IN AN ORGANIZED HEALTH CARE EDUCATION/TRAINING PROGRAM

## 2023-07-12 PROCEDURE — 99232 SBSQ HOSP IP/OBS MODERATE 35: CPT | Performed by: INTERNAL MEDICINE

## 2023-07-12 RX ORDER — METFORMIN HYDROCHLORIDE 750 MG/1
750 TABLET, EXTENDED RELEASE ORAL
Qty: 30 TABLET | Refills: 0 | Status: SHIPPED | OUTPATIENT
Start: 2023-07-12 | End: 2023-07-20

## 2023-07-12 RX ORDER — METOPROLOL TARTRATE 75 MG/1
75 TABLET, FILM COATED ORAL EVERY 12 HOURS SCHEDULED
Qty: 60 TABLET | Refills: 0 | Status: SHIPPED | OUTPATIENT
Start: 2023-07-12 | End: 2023-07-20

## 2023-07-12 RX ORDER — DOXYCYCLINE HYCLATE 100 MG/1
100 CAPSULE ORAL EVERY 12 HOURS SCHEDULED
Qty: 20 CAPSULE | Refills: 0 | Status: SHIPPED | OUTPATIENT
Start: 2023-07-12 | End: 2023-07-22

## 2023-07-12 RX ADMIN — APIXABAN 5 MG: 5 TABLET, FILM COATED ORAL at 09:49

## 2023-07-12 RX ADMIN — DOXYCYCLINE 100 MG: 100 CAPSULE ORAL at 09:49

## 2023-07-12 RX ADMIN — METOPROLOL TARTRATE 50 MG: 50 TABLET, FILM COATED ORAL at 03:33

## 2023-07-12 RX ADMIN — INSULIN LISPRO 2 UNITS: 100 INJECTION, SOLUTION INTRAVENOUS; SUBCUTANEOUS at 07:31

## 2023-07-12 RX ADMIN — ACETAMINOPHEN 975 MG: 325 TABLET, FILM COATED ORAL at 06:22

## 2023-07-12 NOTE — PROGRESS NOTES
Cardiology Progress Note - Casey Martin 74 y.o. male MRN: [id number]    Unit/Bed#: -01 Encounter: [id number]      Assessment/Plan:  1. New onset atrial fibrillation  · Patient found to be in atrial fibrillation with RVR upon presentation to the ER for evaluation of L shoulder pain  · TTE 7/10/2023 revealed EF 60%. BONY yesterday revealed EF 60%, no cardioversion at that time. · TSH 7/9 normal. Pending Lyme titer  · [id number] score 3  · Patient has been in and out of a fib with RVR since admission  · Patient in NSR this am  · Continue Eliquis  · Switch metoprolol dosage to 75 mg BID  · Follow up with cardiology outpatient  · Stable for discharge from cardiac standpoint. 2. Mitral valve abnormality on TTE  · TTE 7/10 revealed EF 60% with moderate sized echodensity on mitral valve, leaflet thickening vs vegetation  · BONY 7/11 revealed EF 60%. Calcification of the anterior mitral valve leaflet. 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 The anterior leaflet is prolapsed. · Consider mitral valve replacement in the future    3. Hypertension  · /69 this am  · Metoprolol dosage switched to 75mg BID    4. Type 2 Diabetes Mellitus  · Last HgbA1C 9.8  · Management per primary team    Patient stable for discharge from cardiac standpoint. Subjective:   Patient seen and examined. No significant events overnight. Denies chest pain, palpitations, SOB, lightheadedness, dizziness, abdominal pain, nausea, vomiting, or syncopal episodes. Objective:     Vitals: Blood pressure 147/68, pulse 71, temperature 98 °F (36.7 °C), resp. rate 18, height 6' (1.829 m), weight 92.1 kg (203 lb), SpO2 95 %. , Body mass index is 27.53 kg/m².,   Orthostatic Blood Pressures    Flowsheet Row Most Recent Value   Blood Pressure 147/68 filed at 07/12/2023 8497   Patient Position - Orthostatic VS Lying filed at 07/11/2023 2049            Intake/Output Summary (Last 24 hours) at 7/12/2023 701  [facility] filed at 7/12/2023 8331  Gross per 24 hour   Intake 360 ml   Output --   Net 360 ml         Physical Exam:    GEN: Adam Pennington appears well, alert and oriented x 3, pleasant and cooperative   HEENT: pupils equal, round, and reactive to light; extraocular muscles intact  NECK: supple   HEART: regular rhythm, normal S1 and S2, no murmurs, clicks, gallops or rubs   LUNGS: clear to auscultation bilaterally; no wheezes, rales, or rhonchi   ABDOMEN: normal bowel sounds, soft, no tenderness, no distention  EXTREMITIES: peripheral pulses normal; no clubbing, cyanosis, or edema      Medications:      Current Facility-Administered Medications:   •  acetaminophen (TYLENOL) tablet 975 mg, 975 mg, Oral, Q8H [address], [facility], , 975 mg at 07/12/23 3700  •  apixaban (ELIQUIS) tablet 5 mg, 5 mg, Oral, BID, [facility], , 5 mg at 07/12/23 0949  •  Diclofenac Sodium (VOLTAREN) 1 % topical gel 2 g, 2 g, Topical, 4x Daily, [facility], 2 g at 07/11/23 0954  •  doxycycline hyclate (VIBRAMYCIN) capsule 100 mg, 100 mg, Oral, Q12H [address], [facility], , 100 mg at 07/12/23 0949  •  insulin lispro (HumaLOG) 100 units/mL subcutaneous injection 1-6 Units, 1-6 Units, Subcutaneous, TID AC, 2 Units at 07/12/23 0731 **AND** Fingerstick Glucose (POCT), , , TID AC, [facility],   •  insulin lispro (HumaLOG) 100 units/mL subcutaneous injection 1-6 Units, 1-6 Units, Subcutaneous, HS, [facility], , 1 Units at 07/11/23 2204  •  lidocaine (LIDODERM) 5 % patch 1 patch, 1 patch, Topical, Daily, [facility], , 1 patch at 07/11/23 0953  •  metoprolol (LOPRESSOR) injection 5 mg, 5 mg, Intravenous, Q6H PRN, Kay Crouch DO, 5 mg at 07/11/23 1028  •  metoprolol tartrate (LOPRESSOR) tablet 75 mg, 75 mg, Oral, Q12H SYDNI, Jaye Bishop PA-C  •  naloxone (NARCAN) 0.04 mg/mL syringe 0.04 mg, 0.04 mg, Intravenous, Q1MIN PRN, Kay Crouch DO  •  ondansetron (ZOFRAN) injection 4 mg, 4 mg, Intravenous, Q4H PRN, Kay Crouch, DO  •  oxyCODONE (ROXICODONE) IR tablet 5 mg, 5 mg, Oral, Q4H PRN, Kay Crouch DO, 5 mg at 07/09/23 1718  •  oxyCODONE (ROXICODONE) split tablet 2.5 mg, 2.5 mg, Oral, Q4H PRN, Kay Crouch DO  •  senna-docusate sodium (SENOKOT S) 8.6-50 mg per tablet 1 tablet, 1 tablet, Oral, HS, Kay Crouch, DO     Labs & Results:    Results from last 7 days   Lab Units 07/09/23  1359 07/09/23  1213 07/09/23  1003   HS TNI 0HR ng/L  --   --  6   HS TNI 2HR ng/L  --  5  --    HS TNI 4HR ng/L 4  --   --    HSTNI D4 ng/L -2  --   --      Results from last 7 days   Lab Units 07/12/23  0504 07/10/23  0453 07/09/23  1003   WBC Thousand/uL 9.93 12.67* 15.59*   HEMOGLOBIN g/dL 15.6 15.5 16.9   HEMATOCRIT % 44.8 45.5 48.1   PLATELETS Thousands/uL 258 207 250         Results from last 7 days   Lab Units 07/10/23  0843 07/10/23  0453 07/09/23  1003   POTASSIUM mmol/L 4.5  --  4.6   CHLORIDE mmol/L 103  --  99   CO2 mmol/L 23  --  19*   BUN mg/dL 18  --  18   CREATININE mg/dL 0.95  --  1.06   CALCIUM mg/dL 9.8  --  9.5   ALK PHOS U/L  --  96 118*   ALT U/L  --  10 14   AST U/L  --  6* 9*     Results from last 7 days   Lab Units 07/10/23  1240 07/10/23  0453 07/09/23  1943 07/09/23  1359 07/09/23  1003   INR   --   --   --  1.12 1.12   PTT seconds 40* 36 29 27 28

## 2023-07-12 NOTE — DISCHARGE SUMMARY
[address]  Discharge- Adam Pennington 1955, 74 y.o. male MRN: [id number]  Unit/Bed#: -01 Encounter: [id number]  Primary Care Provider: Gentry Jordan MD   Date and time admitted to hospital: 7/9/2023  9:44 AM    * New onset atrial fibrillation [facility])  Assessment & Plan  · [id number] score is 3 with a 3.2% of her stroke risk per year   · Patient had umbilical hernia repair on 6/28  · Eliquis on discharge  · Metoprolol 75 mg twice daily  · Echocardiogram-EF 60%; mitral valve with moderate size mobile echodensity seen on anterior leaflet which could be leaflet thickening versus vegetation  · BONY-EF 60% and mitral valve calcification noted on anterior mitral valve leaflet and significant myxomatous degenerative of posterior mitral valve leaflet  · Blood culture x2 no growth at 24 hours    Wrist pain  Assessment & Plan  · Patient also presenting with wrist pain  · Concerning for gout although denies history      Hypertension  Assessment & Plan  History of hypertension  Follow-up with primary care provider    Acute pain of left shoulder  Assessment & Plan  · Denies any complaint on exam at this time  · X-ray with calcific tendinitis  · Lyme still pending at this time  · Given marked improvement with doxycycline will treat with 14-day course for Lyme arthritis    Abnormal LFTs  Assessment & Plan  Stable      Hyponatremia  Assessment & Plan  Resolved at this time    Diabetes mellitus [facility])  Assessment & Plan  Lab Results   Component Value Date    HGBA1C 9.8 (H) 04/13/2023       Recent Labs     07/10/23  1136 07/10/23  1613 07/10/23  2057 07/11/23  1123   POCGLU 238* 173* 195* 224*       Blood Sugar Average: Last 72 hrs:  · Given metformin extended release on discharge  · Endocrinology referral placed for discharge      Medical Problems     Resolved Problems  Date Reviewed: 7/11/2023   None       Discharging Physician / Practitioner: Terry Srinivasan DO  PCP: Gentry Jordan MD  Admission Date:   Admission Orders (From admission, onward)     Ordered        07/10/23 1525  Inpatient Admission  Once            07/09/23 1215  Place in Observation  Once                      Discharge Date: 07/12/23    Consultations During Hospital Stay:  · Cardiology      Reason for Admission: Left shoulder pain    Hospital Course: Yo Atwood is a 74 y.o. male patient who originally presented to the hospital on 7/9/2023 due to left shoulder pain. Patient noted to have left shoulder pain and on EKG noted to have A-fib with RVR which was new. Patient seen by cardiology team and underwent work-up including BONY which was negative for any thrombus. Patient will be started on Eliquis for anticoagulation and metoprolol for heart rate control. Patient also to follow-up with endocrinology as outpatient for diabetes control. Patient also noted to have rash likely related to Lyme disease and placed on doxycycline with improvement. Patient will complete 14-day course of doxycycline. Patient should follow-up with primary care provider and cardiology    Please see above list of diagnoses and related plan for additional information. Condition at Discharge: stable    Discharge Day Visit / Exam:   Subjective: Patient resting comfortably on examination. Patient had no overnight events. Patient eager to go home. Vitals: Blood Pressure: 147/68 (07/12/23 0729)  Pulse: 71 (07/12/23 0746)  Temperature: 98 °F (36.7 °C) (07/12/23 0746)  Temp Source: Oral (07/11/23 1851)  Respirations: 18 (07/11/23 2317)  Height: 6' (182.9 cm) (07/11/23 0840)  Weight - Scale: 92.1 kg (203 lb) (07/11/23 0840)  SpO2: 95 % (07/12/23 0746)  Exam:   Physical Exam  Vitals and nursing note reviewed. Constitutional:       General: He is not in acute distress. Appearance: He is well-developed. HENT:      Head: Normocephalic and atraumatic. Eyes:      General: No scleral icterus.      Conjunctiva/sclera: Conjunctivae normal.   Cardiovascular: Rate and Rhythm: Normal rate and regular rhythm. Heart sounds: Normal heart sounds. No murmur heard. No friction rub. No gallop. Pulmonary:      Effort: Pulmonary effort is normal. No respiratory distress. Breath sounds: Normal breath sounds. No wheezing or rales. Abdominal:      General: Bowel sounds are normal. There is no distension. Palpations: Abdomen is soft. Tenderness: There is no abdominal tenderness. Musculoskeletal:         General: Normal range of motion. Skin:     General: Skin is warm. Findings: No rash. Neurological:      Mental Status: He is alert and oriented to person, place, and time. Discussion with Family: Updated  (wife) at bedside. Discharge instructions/Information to patient and family:   See after visit summary for information provided to patient and family. Provisions for Follow-Up Care:  See after visit summary for information related to follow-up care and any pertinent home health orders. Disposition:   Home    Planned Readmission: None     Discharge Statement:  I spent 33 minutes discharging the patient. This time was spent on the day of discharge. I had direct contact with the patient on the day of discharge. Greater than 50% of the total time was spent examining patient, answering all patient questions, arranging and discussing plan of care with patient as well as directly providing post-discharge instructions. Additional time then spent on discharge activities. Discharge Medications:  See after visit summary for reconciled discharge medications provided to patient and/or family.       **Please Note: This note may have been constructed using a voice recognition system**

## 2023-07-12 NOTE — UTILIZATION REVIEW
Initial Clinical Review    Admission: Date/Time/Statement:   Admission Orders (From admission, onward)     Ordered        07/10/23 1525  Inpatient Admission  Once            07/09/23 1215  Place in Observation  Once                       Inpatient Admission     Standing Status:   Standing     Number of Occurrences:   1     Order Specific Question:   Level of Care     Answer:   Med Surg [16]     Order Specific Question:   Estimated length of stay     Answer:   More than 2 Midnights     Order Specific Question:   Certification     Answer:   I certify that inpatient services are medically necessary for this patient for a duration of greater than two midnights. See H&P and MD Progress Notes for additional information about the patient's course of treatment. ED Arrival Information     Expected   -    Arrival   7/9/2023 09:42    Acuity   Emergent            Means of arrival   Walk-In    Escorted by   Spouse    Service   Hospitalist    Admission type   Emergency            Arrival complaint   ARM PAIN           Chief Complaint   Patient presents with   • Arm Pain     Patient c/o left shoulder pain since yesterday following possible spider bite. Initial Presentation: 74 y.o. male to ED from home w/  of left shoulder pain, during work-up he was found to be on A-fib with RVR. Patient was given IV diltiazem with improvement in heart rate. Admitted IP status for new onset afib plan to check echo , tsh , lyme titers , cardiology consult . also c/o wrist pain , concern for gout , check uric acid , ESR and CRP . HTN taking lisinopril , hold and start lopressor . Pain L shoulder xray w/ calcific tendinitis , start doxycycline , lidocaine patch and tylenol prn . Abnormal LFTs in setting of dehydration , check in am . DM SSI and monitor. 7/9 Ortho Consult   Left shoulder pain, likely cellulitis, possible bug/tickspider bite, multiple joint complaints .  WBAT , PT OT , enc ROM , pain control , DVT ppx .     7/10 Ortho Note   showed significant improvement in symptoms since admission. Low concern for acute joint infection at this time. Ortho signing off .     7/10 Cardiology Consult   New onset afib , mitral valve abnormality noted on echo . Plan cont tele , lopressor . DC heparin and initiate eliquis . moderate size mobile echodensity on mitral valve which could be leaflet thickening versus vegetation. NPO after MN , plan for 7/11. Date: 7/11  Day 2: f/u lyme titer . BONY today per cardiology . his heart rates are still uncontrolled and need to adjust his medications. Patient did go into A-fib RVR overnight last night and again during BONY today.     7/11 Cardiology Note   Episode of tachycardia to the 110s-120s overnight, patient given IV metoprolol and IV cardizem. Patient states this episode occurred when he got up and walked to the bathroom, and denies symptoms during this overnight episode. Continues to convert between afib w RVR and NSR. he is slightly lightheaded and dizzy but felt this was related to the BONY and sedation as he denies experiencing these symptoms before the BONY. plan to inc lopressor , cont eliquis . monitor BP .      Date: 7/12   Day 3: Has surpassed a 2nd midnight with active treatments and services, which include cont monitoring of new onset afib     ED Triage Vitals   Temperature Pulse Respirations Blood Pressure SpO2   07/09/23 0950 07/09/23 0950 07/09/23 0950 07/09/23 0954 07/09/23 0950   98.2 °F (36.8 °C) (!) 150 18 162/98 98 %      Temp Source Heart Rate Source Patient Position - Orthostatic VS BP Location FiO2 (%)   07/09/23 0950 07/09/23 1124 07/09/23 1045 07/09/23 1045 --   Oral Monitor Lying Right arm       Pain Score       07/09/23 1752       5          Wt Readings from Last 1 Encounters:   07/11/23 92.1 kg (203 lb)     Additional Vital Signs:   07/12/23 07:46:27 98 °F (36.7 °C) 71 -- -- -- 95 % -- -- --   07/12/23 07:29:46 -- 77 -- 147/68 94 93 % -- -- --   07/12/23 05:00:10 -- 70 -- 144/81 102 97 % -- -- --   07/12/23 03:33:29 -- 80 -- 129/79 96 94 % -- -- --   07/12/23 0333 -- 85 -- 129/79 -- -- -- -- --   07/11/23 23:17:50 98.8 °F (37.1 °C) 81 18 131/74 93 94 % -- -- --   07/11/23 20:49:58 -- 84 -- 142/88 106 95 % -- -- Lying   07/11/23 20:45:30 -- 85 -- 145/122 Abnormal  130 92 % -- -- Lying   07/11/23 18:51:05 98.4 °F (36.9 °C) 86 20 144/78 100 96 % -- -- Sitting   07/11/23 15:16:43 97.9 °F (36.6 °C) 76 18 143/80 101 96 % -- -- Sitting   07/11/23 15:14:52 97.9 °F (36.6 °C) 77 18 143/80 101 95 % -- -- --   07/11/23 1200 -- 123 Abnormal  27 Abnormal  159/92 119 97 % -- None (Room air) --   07/11/23 1100 97.8 °F (36.6 °C) 111 Abnormal  27 Abnormal  156/87 114 94 % -- None (Room air) --   07/11/23 1030 -- 124 Abnormal  25 Abnormal  170/74 106 94 % -- -- --   07/11/23 1000 -- 135 Abnormal  33 Abnormal  153/80 111 95 % -- None (Room air) --   07/11/23 0952 -- 126 Abnormal  25 Abnormal  159/72 103 96 % -- -- --   07/11/23 0917 -- 144 Abnormal  20 120/63 -- 92 % -- None (Room air) --   07/11/23 0900 98 °F (36.7 °C) 124 Abnormal  20 123/72 -- 96 % 4 L/min Simple mask --   07/11/23 0840 -- 144 Abnormal  -- 120/63 -- -- -- -- --   07/11/23 0730 98 °F (36.7 °C) 120 Abnormal  20 178/83 Abnormal  -- 93 % -- None (Room air) --   07/11/23 0715 -- 120 Abnormal  22 -- -- 94 % -- None (Room air) --   07/11/23 0515 -- 130 Abnormal  26 Abnormal  170/79 113 95 % -- -- --   07/11/23 0513 -- 122 Abnormal  24 Abnormal  170/79 113 95 % -- -- --   07/11/23 0330 -- 128 Abnormal  23 Abnormal  149/97 119 94 % -- -- --   07/11/23 0100 -- 80 23 Abnormal  181/89 Abnormal  123 93 % -- -- --   07/10/23 1947 98.2 °F (36.8 °C) -- -- -- -- -- -- None (Room air) Lying   07/10/23 1900 98.3 °F (36.8 °C) 95 20 181/86 Abnormal  123 95 % -- None (Room air) Lying   07/10/23 1500 98.1 °F (36.7 °C) 88 31 Abnormal  174/93 Abnormal  126 94 % -- None (Room air) Sitting   07/10/23 1100 98.2 °F (36.8 °C) -- -- -- -- -- -- -- Sitting   07/10/23 0827 -- 94 -- 169/81 -- -- -- -- --   07/10/23 0751 98.4 °F (36.9 °C) 93 -- -- -- -- -- -- Lying   07/10/23 0315 -- 123 Abnormal  22 140/89 107 93 % -- --        Pertinent Labs/Diagnostic Test Results:   7/11 BONY •  Left Ventricle: Left ventricular cavity size is normal. Wall thickness is normal. Systolic function is normal (60%). Wall motion is normal.  •  Right Ventricle: Right ventricular cavity size is normal. Systolic function is normal.  •  Atrial Septum: No patent foramen ovale detected using color flow Doppler at rest.  •  Left Atrial Appendage: There is normal function. There is no thrombus. •  Mitral Valve: There is calcification of the anterior mitral valve leaflet. 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 The posterior leaflet is prolapsed. There is mild to moderate regurgitation.   7/10 Echo    •  Left Ventricle: Left ventricular cavity size is normal. Wall thickness is normal. Systolic function is normal (60%). Wall motion is normal. Diastolic function is normal.  •  Aortic Valve: There is mild to moderate regurgitation. •  Mitral Valve: There is moderate sized mobile echodensity seen on the anterior leaflet margin / tip which could be leaflet thickening vs vegetation. A BONY is recommended for further evaluation. There is mild annular calcification. There is mild regurgitation. •  Tricuspid Valve: There is trace regurgitation. The right ventricular systolic pressure is normal.  •  Pulmonic Valve: There is trace regurgitation. •  IVC/SVC: The inferior vena cava is dilated. Respirophasic changes were normal.  7/9 EKG Atrial fibrillation with rapid ventricular response  Abnormal ECG    CTA ED chest PE Study   Final Result by Suki Stanley MD (07/09 1127)   No PE.                      Workstation performed: [id number]         XR shoulder 2+ views LEFT   Final Result by Tennille Torres MD (07/09 4232)      No acute osseous abnormality   Calcific tendinitis supraspinatus.       Workstation performed: [id number]               Results from last 7 days   Lab Units 07/12/23  0504 07/10/23  0453 07/09/23  1003   WBC Thousand/uL 9.93 12.67* 15.59*   HEMOGLOBIN g/dL 15.6 15.5 16.9   HEMATOCRIT % 44.8 45.5 48.1   PLATELETS Thousands/uL 258 207 250   NEUTROS ABS Thousands/µL 5.41 9.27* 11.94*         Results from last 7 days   Lab Units 07/10/23  0843 07/09/23  1003   SODIUM mmol/L 137 128*   POTASSIUM mmol/L 4.5 4.6   CHLORIDE mmol/L 103 99   CO2 mmol/L 23 19*   ANION GAP mmol/L 11 10   BUN mg/dL 18 18   CREATININE mg/dL 0.95 1.06   EGFR ml/min/1.73sq m 83 73   CALCIUM mg/dL 9.8 9.5   PHOSPHORUS mg/dL 2.8  --      Results from last 7 days   Lab Units 07/10/23  0453 07/09/23  1003   AST U/L 6* 9*   ALT U/L 10 14   ALK PHOS U/L 96 118*   TOTAL PROTEIN g/dL 6.7 7.4   ALBUMIN g/dL 3.6 4.1   TOTAL BILIRUBIN mg/dL 0.87 1.31*   BILIRUBIN DIRECT mg/dL 0.24*  --      Results from last 7 days   Lab Units 07/12/23  0609 07/11/23 2058 07/11/23  1605 07/11/23  1123 07/10/23  2057 07/10/23  1613 07/10/23  1136 07/10/23  0746 07/09/23 2059 07/09/23  1704   POC GLUCOSE mg/dl 217* 177* 257* 224* 195* 173* 238* 209* 200* 325*     Results from last 7 days   Lab Units 07/10/23  0843 07/09/23  1003   GLUCOSE RANDOM mg/dL 233* 403*     Results from last 7 days   Lab Units 07/09/23  1337   OSMOLALITY, SERUM mmol/          Results from last 7 days   Lab Units 07/09/23  1109   PH ATIYA  7.394   PCO2 ATIYA mm Hg 34.2*   PO2 ATIYA mm Hg 49.6*   HCO3 ATIYA mmol/L 20.4*   BASE EXC ATIYA mmol/L -3.5   O2 CONTENT ATIYA ml/dL 20.2   O2 HGB, VENOUS % 82.4*     Results from last 7 days   Lab Units 07/09/23  1359 07/09/23  1213 07/09/23  1003   HS TNI 0HR ng/L  --   --  6   HS TNI 2HR ng/L  --  5  --    HSTNI D2 ng/L  --  -1  --    HS TNI 4HR ng/L 4  --   --    HSTNI D4 ng/L -2  --   --      Results from last 7 days   Lab Units 07/10/23  1240 07/10/23  0453 07/09/23  1943 07/09/23  1359 07/09/23  1003   PROTIME seconds  --   --   --  14.2 14.2   INR   --   --   --  1.12 1.12   PTT seconds 40* 36 29 27 28     Results from last 7 days   Lab Units 07/09/23  1337   TSH 3RD GENERATON uIU/mL 3.172     Results from last 7 days   Lab Units 07/10/23  0843   PROCALCITONIN ng/ml 0.16     Results from last 7 days   Lab Units 07/09/23  1003   LACTIC ACID mmol/L 1.1     Results from last 7 days   Lab Units 07/09/23  1003   BNP pg/mL 139*     Results from last 7 days   Lab Units 07/10/23  0843 07/09/23  1337   CRP mg/L  --  119.1*   SED RATE mm/hour 32*  --      Results from last 7 days   Lab Units 07/09/23  1339 07/09/23  1337   OSMOLALITY, SERUM mmol/KG  --  293   OSMO UR mmol/  --      Results from last 7 days   Lab Units 07/09/23  1340 07/09/23  1339   CLARITY UA  Clear  --    COLOR UA  Light Yellow  --    SPEC GRAV UA  1.033*  --    PH UA  5.0  --    GLUCOSE UA mg/dl >=1000 (1%)*  --    KETONES UA mg/dl 10 (1+)*  --    BLOOD UA  Trace*  --    PROTEIN UA mg/dl Trace*  --    NITRITE UA  Negative  --    BILIRUBIN UA  Negative  --    UROBILINOGEN UA (BE) mg/dl <2.0  --    LEUKOCYTES UA  Elevated glucose may cause decreased leukocyte values. See urine microscopic for UWBC result*  --    WBC UA /hpf None Seen  --    RBC UA /hpf 1-2  --    BACTERIA UA /hpf None Seen  --    EPITHELIAL CELLS WET PREP /hpf None Seen  --    SODIUM UR   --  54     Results from last 7 days   Lab Units 07/10/23  1239   BLOOD CULTURE  No Growth at 24 hrs. No Growth at 24 hrs.      ED Treatment:   Medication Administration from 07/09/2023 0942 to 07/09/2023 1742       Date/Time Order Dose Route Action     07/09/2023 1007 EDT morphine injection 4 mg 4 mg Intravenous Given     07/09/2023 1015 EDT diltiazem (CARDIZEM) injection 20 mg 20 mg Intravenous Given     07/09/2023 1105 EDT sodium chloride 0.9 % bolus 1,000 mL 1,000 mL Intravenous New Bag     07/09/2023 1109 EDT diltiazem (CARDIZEM) injection 30 mg 30 mg Intravenous Given     07/09/2023 1145 EDT diltiazem (CARDIZEM) tablet 60 mg 60 mg Oral Given     07/09/2023 1337 EDT lidocaine (LIDODERM) 5 % patch 1 patch 1 patch Topical Medication Applied     07/09/2023 1443 EDT metoprolol tartrate (LOPRESSOR) tablet 25 mg 25 mg Oral Given     07/09/2023 1402 EDT heparin (porcine) 25,000 units in 0.45% NaCl 250 mL infusion (premix) 11.1 Units/kg/hr Intravenous New Bag     07/09/2023 1443 EDT doxycycline hyclate (VIBRAMYCIN) capsule 100 mg 100 mg Oral Given     07/09/2023 1444 EDT Diclofenac Sodium (VOLTAREN) 1 % topical gel 2 g 2 g Topical Given     07/09/2023 1443 EDT acetaminophen (TYLENOL) tablet 975 mg 975 mg Oral Given     07/09/2023 1718 EDT oxyCODONE (ROXICODONE) IR tablet 5 mg 5 mg Oral Given        Past Medical History:   Diagnosis Date   • Diabetes mellitus ([address])    • Hypertension    • Meniere's disease      Present on Admission:  • Diabetes mellitus ([address])  • Hyponatremia  • New onset atrial fibrillation (HCC)      Admitting Diagnosis: Arm pain [M79.603]  Left shoulder pain [M25.512]  Atrial fibrillation with RVR ([address]) [I48.91]  Age/Sex: 74 y.o. male  Admission Orders:  Scheduled Medications:  acetaminophen, 975 mg, Oral, Q8H [facility]  apixaban, 5 mg, Oral, BID  Diclofenac Sodium, 2 g, Topical, 4x Daily  doxycycline hyclate, 100 mg, Oral, Q12H SYDNI  insulin lispro, 1-6 Units, Subcutaneous, TID AC  insulin lispro, 1-6 Units, Subcutaneous, HS  lidocaine, 1 patch, Topical, Daily  metoprolol tartrate, 50 mg, Oral, Q8H  senna-docusate sodium, 1 tablet, Oral, HS      Continuous IV Infusions:     PRN Meds:  metoprolol, 5 mg, Intravenous, Q6H PRN   7/10 x1  7/11 x3  naloxone, 0.04 mg, Intravenous, Q1MIN PRN  ondansetron, 4 mg, Intravenous, Q4H PRN  oxyCODONE, 5 mg, Oral, Q4H PRN  oxyCODONE, 2.5 mg, Oral, Q4H PRN    Fingerstick ac and hs   Tele   Cardiac diet   Up and OOB     IP CONSULT TO CARDIOLOGY  IP CONSULT TO ORTHOPEDIC SURGERY    Network Utilization Review Department  ATTENTION: Please call with any questions or concerns to [phone] and carefully listen to the prompts so that you are directed to the right person. All voicemails are confidential.  [employer] all requests for admission clinical reviews, approved or denied determinations and any other requests to dedicated fax number below belonging to the campus where the patient is receiving treatment.  List of dedicated fax numbers for the Facilities:  [place] DENIALS (Administrative/Medical Necessity) [phone] [address] (Maternity/NICU/Pediatrics) [phone]   [address] [place] [address] [phone]   [facility] [address] [facility] [phone]   [address] [place] [address] [address] [facility] [address] [phone]   [address] [address] [phone]

## 2023-07-13 RX ORDER — APIXABAN 5 MG/1
TABLET, FILM COATED ORAL
Qty: 60 TABLET | Refills: 0 | Status: SHIPPED | OUTPATIENT
Start: 2023-07-13

## 2023-07-15 LAB
BACTERIA BLD CULT: NORMAL
BACTERIA BLD CULT: NORMAL

## 2023-07-20 ENCOUNTER — TELEPHONE (OUTPATIENT)
Dept: ADMINISTRATIVE | Facility: OTHER | Age: 68
End: 2023-07-20

## 2023-07-20 ENCOUNTER — OFFICE VISIT (OUTPATIENT)
Dept: FAMILY MEDICINE CLINIC | Facility: CLINIC | Age: 68
End: 2023-07-20
Payer: COMMERCIAL

## 2023-07-20 VITALS
TEMPERATURE: 97.7 F | WEIGHT: 205 LBS | SYSTOLIC BLOOD PRESSURE: 148 MMHG | HEART RATE: 75 BPM | DIASTOLIC BLOOD PRESSURE: 72 MMHG | HEIGHT: 72 IN | OXYGEN SATURATION: 95 % | BODY MASS INDEX: 27.77 KG/M2

## 2023-07-20 DIAGNOSIS — I48.91 NEW ONSET ATRIAL FIBRILLATION (HCC): ICD-10-CM

## 2023-07-20 DIAGNOSIS — I71.21 ANEURYSM OF ASCENDING AORTA WITHOUT RUPTURE (HCC): ICD-10-CM

## 2023-07-20 DIAGNOSIS — I48.91 ATRIAL FIBRILLATION WITH RVR (HCC): ICD-10-CM

## 2023-07-20 DIAGNOSIS — I10 PRIMARY HYPERTENSION: ICD-10-CM

## 2023-07-20 DIAGNOSIS — R06.02 SHORTNESS OF BREATH: ICD-10-CM

## 2023-07-20 DIAGNOSIS — E55.9 VITAMIN D DEFICIENCY: ICD-10-CM

## 2023-07-20 DIAGNOSIS — E11.65 TYPE 2 DIABETES MELLITUS WITH HYPERGLYCEMIA, WITHOUT LONG-TERM CURRENT USE OF INSULIN (HCC): Primary | ICD-10-CM

## 2023-07-20 LAB — GLUCOSE SERPL-MCNC: 217 MG/DL (ref 65–140)

## 2023-07-20 PROCEDURE — 99204 OFFICE O/P NEW MOD 45 MIN: CPT

## 2023-07-20 RX ORDER — LOSARTAN POTASSIUM 25 MG/1
25 TABLET ORAL DAILY
Qty: 90 TABLET | Refills: 1 | Status: SHIPPED | OUTPATIENT
Start: 2023-07-20 | End: 2023-07-28 | Stop reason: SDUPTHER

## 2023-07-20 RX ORDER — ALBUTEROL SULFATE 90 UG/1
2 AEROSOL, METERED RESPIRATORY (INHALATION) EVERY 6 HOURS PRN
Qty: 18 G | Refills: 5 | Status: SHIPPED | OUTPATIENT
Start: 2023-07-20

## 2023-07-20 RX ORDER — ROSUVASTATIN CALCIUM 5 MG/1
5 TABLET, COATED ORAL EVERY EVENING
Qty: 90 TABLET | Refills: 1 | Status: SHIPPED | OUTPATIENT
Start: 2023-07-20 | End: 2023-07-28 | Stop reason: SDUPTHER

## 2023-07-20 RX ORDER — METOPROLOL TARTRATE 50 MG/1
50 TABLET, FILM COATED ORAL EVERY 12 HOURS
Qty: 30 TABLET | Refills: 5
Start: 2023-07-20 | End: 2023-07-28 | Stop reason: SDUPTHER

## 2023-07-20 NOTE — ASSESSMENT & PLAN NOTE
Reviewed previous imaging, Aortic root 4 cm. Ascending aorta 4.1 cm. Is appropriate tapering through the arch and   descending thoracic aorta. Visit scheduled with cardiology next week, will continue to monitor. Did have recent echo as well.

## 2023-07-20 NOTE — ASSESSMENT & PLAN NOTE
Will repeat A1C and switch from metformin to jardiance, continue to monitor blood sugars and will start statin. Follow up in 2 weeks.    Lab Results   Component Value Date    HGBA1C 9.8 (H) 04/13/2023

## 2023-07-20 NOTE — TELEPHONE ENCOUNTER
Upon review of the In Basket request we have found as a result of outreach that patient did not have the requested item(s) completed. No colonoscopy performed and Cologuard was not completed. Any additional questions or concerns should be emailed to the Practice Liaisons via the appropriate education email address, please do not reply via In Basket.     Thank you  Juan Miguel Joy

## 2023-07-20 NOTE — PATIENT INSTRUCTIONS
Please schedule heart monitor and have fasting lab work when you go for holter monitor. Follow up in 2 weeks. Type 2 Diabetes Management for Adults   AMBULATORY CARE:   Type 2 diabetes  is a disease that affects how your body uses glucose (sugar). Either your body cannot make enough insulin, or it cannot use the insulin correctly. It is important to keep diabetes controlled to prevent damage to your heart, blood vessels, and other organs. Management will help you feel well and enjoy your daily activities. Your diabetes care team providers can help you make a plan to fit diabetes care into your schedule. Your plan can change over time to fit your needs and your family's needs. Have someone call your local emergency number (911 in the [address]) if:   You cannot be woken. You have signs of diabetic ketoacidosis:     confusion, fatigue    vomiting    rapid heartbeat    fruity smelling breath    extreme thirst    dry mouth and skin    You have any of the following signs of a heart attack:      Squeezing, pressure, or pain in your chest    You may  also have any of the following:     Discomfort or pain in your back, neck, jaw, stomach, or arm    Shortness of breath    Nausea or vomiting    Lightheadedness or a sudden cold sweat    You have any of the following signs of a stroke:      Numbness or drooping on one side of your face     Weakness in an arm or leg    Confusion or difficulty speaking    Dizziness, a severe headache, or vision loss    Call your doctor or diabetes care team provider if:   You have a sore or wound that will not heal.    You have a change in the amount you urinate. Your blood sugar levels are higher than your target goals. You often have lower blood sugar levels than your target goals. Your skin is red, dry, warm, or swollen. You have trouble coping with diabetes, or you feel anxious or depressed. You have questions or concerns about your condition or care.     What you need to know about high blood sugar levels:  High blood sugar levels may not cause any symptoms. You may feel more thirsty or urinate more often than usual. Over time, high blood sugar levels can damage your nerves, blood vessels, tissues, and organs. The following can increase your blood sugar levels:  Large meals or large amounts of carbohydrates at one time    Less physical activity    Stress    Illness    A lower dose of diabetes medicine or insulin, or a late dose    What you need to know about low blood sugar levels:  Symptoms include feeling shaky, dizzy, irritable, or confused. You can prevent symptoms by keeping your blood sugar levels from going too low. Treat a low blood sugar level right away:      Drink 4 ounces of juice or have 1 tube of glucose gel. Check your blood sugar level again 10 to 15 minutes later. When the level goes back to normal, eat a meal or snack to prevent another decrease. Keep glucose gel, raisins, or hard candy with you at all times to treat a low blood sugar level. Your blood sugar level can get too low if you take diabetes medicine or insulin and do not eat enough food. If you use insulin, check your blood sugar level before you exercise. If your blood sugar level is below 100 mg/dL, eat 4 crackers or 2 ounces of raisins, or drink 4 ounces of juice. Check your level every 30 minutes if you exercise longer than 1 hour. You may need a snack during or after exercise. What you can do to manage your blood sugar levels:   Check your blood sugar levels as directed and as needed. Several items are available to use to check your levels. You may need to check by testing a drop of blood in a glucose monitor. You may instead be given a continuous glucose monitoring (CGM) device. The device is worn at all times. The CGM checks your blood sugar level every 5 minutes. It sends results to an electronic device such as a smart phone.  A CGM can be used with or without an insulin pump. You and your diabetes care team providers will decide on the best method for you. The goal for blood sugar levels before meals  is between 80 and 130 mg/dL and 2 hours after eating  is lower than 180 mg/dL. Make healthy food choices. Work with a dietitian to develop a meal plan that works for you and your schedule. A dietitian can help you learn how to eat the right amount of carbohydrates during your meals and snacks. Carbohydrates can raise your blood sugar level if you eat too many at one time. Examples of foods that contain carbohydrates are breads, cereals, rice, pasta, and sweets. Eat high-fiber foods as directed. Fiber helps improve blood sugar levels. Fiber also lowers your risk for heart disease and other problems diabetes can cause. Examples of high-fiber foods include vegetables, whole-grain bread, and beans such as guardado beans. Your dietitian can tell you how much fiber to have each day. Get regular physical activity. Physical activity can help you get to your target blood sugar level goal and manage your weight. Get at least 150 minutes of moderate to vigorous aerobic physical activity each week. Do not miss more than 2 days in a row. Do not sit longer than 30 minutes at a time. Your healthcare provider can help you create an activity plan. The plan can include the best activities for you and can help you build your strength and endurance. Maintain a healthy weight. Ask your team what a healthy weight is for you. A healthy weight can help you control diabetes and prevent heart disease. Ask your team to help you create a weight loss plan, if needed. Weight loss can help make a difference in managing diabetes. Your team will help you set a weight-loss goal, such as 10 to 15 pounds, or 5% of your extra weight. Together you and your team can set manageable weight loss goals. Take your diabetes medicine or insulin as directed.   You may need diabetes medicine, insulin, or both to help control your blood sugar levels. Your healthcare provider will teach you how and when to take your diabetes medicine or insulin. You will also be taught about side effects oral diabetes medicine can cause. Insulin may be injected or given through a pump or pen. You and your providers will decide on the best method for you: An insulin pump  is an implanted device that gives your insulin 24 hours a day. An insulin pump prevents the need for multiple insulin injections in a day. An insulin pen  is a device prefilled with the right amount of insulin. You and your family members will be taught how to draw up and give insulin  if this is the best method for you. Your providers will also teach you how to dispose of needles and syringes. You will learn how much insulin you need  and when to give it. You will be taught when not to give insulin. You will also be taught what to do if your blood sugar level drops too low. This may happen if you take insulin and do not eat the right amount of carbohydrates. More ways to manage type 2 diabetes:   Wear medical alert identification. Wear medical alert jewelry or carry a card that says you have diabetes. Ask your provider where to get these items. Do not smoke. Nicotine and other chemicals in cigarettes and cigars can cause lung and blood vessel damage. It also makes it more difficult to manage your diabetes. Ask your provider for information if you currently smoke and need help to quit. Do not use e-cigarettes or smokeless tobacco in place of cigarettes or to help you quit. They still contain nicotine. Check your feet each day for cuts, scratches, calluses, or other wounds. Look for redness and swelling, and feel for warmth. Wear shoes that fit well. Check your shoes for rocks or other objects that can hurt your feet. Do not walk barefoot or wear shoes without socks.  Wear cotton socks to help keep your feet dry.         Ask about vaccines you may need. You have a higher risk for serious illness if you get the flu, pneumonia, COVID-19, or hepatitis. Ask your provider if you should get vaccines to prevent these or other diseases, and when to get the vaccines. Talk to your provider if you become stressed about diabetes care. Sometimes being able to fit diabetes care into your life can cause increased stress. The stress can cause you not to take care of yourself properly. Your care team providers can help by offering tips about self-care. Your providers may suggest you talk to a mental health provider who can listen and offer help with self-care issues. Have your A1c checked as directed. Your provider may check your A1c every 3 months, or 2 times each year if your diabetes is controlled. An A1c test shows the average amount of sugar in your blood over the past 2 to 3 months. Your provider will tell you what your A1c level should be. Have screening tests as directed. Your provider may recommend screening for complications of diabetes and other conditions that may develop. Some screenings may begin right away and some may happen within the first 5 years of diagnosis:    Examples of diabetes complications  include kidney problems, high cholesterol, high blood pressure, blood vessel problems, eye problems, and sleep apnea. You may be screened for a low vitamin B level  if you take oral diabetes medicine for a long time. Women of childbearing years may be screened  for polycystic ovarian syndrome (PCOS). Follow up with your doctor or diabetes care team providers as directed: You may need to have blood tests done before your follow-up visit. The test results will show if changes need to be made in your treatment or self-care. Talk to your provider if you cannot afford your medicine. Write down your questions so you remember to ask them during your visits.   © Copyright [employer] 2022 Information is for End User's use only and may not be sold, redistributed or otherwise used for commercial purposes. The above information is an  only. It is not intended as medical advice for individual conditions or treatments. Talk to your doctor, nurse or pharmacist before following any medical regimen to see if it is safe and effective for you. Type 2 Diabetes Management for Adults   AMBULATORY CARE:   Type 2 diabetes  is a disease that affects how your body uses glucose (sugar). Either your body cannot make enough insulin, or it cannot use the insulin correctly. It is important to keep diabetes controlled to prevent damage to your heart, blood vessels, and other organs. Management will help you feel well and enjoy your daily activities. Your diabetes care team providers can help you make a plan to fit diabetes care into your schedule. Your plan can change over time to fit your needs and your family's needs. Have someone call your local emergency number (911 in the [address]) if:   You cannot be woken. You have signs of diabetic ketoacidosis:     confusion, fatigue    vomiting    rapid heartbeat    fruity smelling breath    extreme thirst    dry mouth and skin    You have any of the following signs of a heart attack:      Squeezing, pressure, or pain in your chest    You may  also have any of the following:     Discomfort or pain in your back, neck, jaw, stomach, or arm    Shortness of breath    Nausea or vomiting    Lightheadedness or a sudden cold sweat    You have any of the following signs of a stroke:      Numbness or drooping on one side of your face     Weakness in an arm or leg    Confusion or difficulty speaking    Dizziness, a severe headache, or vision loss    Call your doctor or diabetes care team provider if:   You have a sore or wound that will not heal.    You have a change in the amount you urinate. Your blood sugar levels are higher than your target goals.     You often have lower blood sugar levels than your target goals. Your skin is red, dry, warm, or swollen. You have trouble coping with diabetes, or you feel anxious or depressed. You have questions or concerns about your condition or care. What you need to know about high blood sugar levels:  High blood sugar levels may not cause any symptoms. You may feel more thirsty or urinate more often than usual. Over time, high blood sugar levels can damage your nerves, blood vessels, tissues, and organs. The following can increase your blood sugar levels:  Large meals or large amounts of carbohydrates at one time    Less physical activity    Stress    Illness    A lower dose of diabetes medicine or insulin, or a late dose    What you need to know about low blood sugar levels:  Symptoms include feeling shaky, dizzy, irritable, or confused. You can prevent symptoms by keeping your blood sugar levels from going too low. Treat a low blood sugar level right away:      Drink 4 ounces of juice or have 1 tube of glucose gel. Check your blood sugar level again 10 to 15 minutes later. When the level goes back to normal, eat a meal or snack to prevent another decrease. Keep glucose gel, raisins, or hard candy with you at all times to treat a low blood sugar level. Your blood sugar level can get too low if you take diabetes medicine or insulin and do not eat enough food. If you use insulin, check your blood sugar level before you exercise. If your blood sugar level is below 100 mg/dL, eat 4 crackers or 2 ounces of raisins, or drink 4 ounces of juice. Check your level every 30 minutes if you exercise longer than 1 hour. You may need a snack during or after exercise. What you can do to manage your blood sugar levels:   Check your blood sugar levels as directed and as needed. Several items are available to use to check your levels. You may need to check by testing a drop of blood in a glucose monitor.  You may instead be given a continuous glucose monitoring (CGM) device. The device is worn at all times. The CGM checks your blood sugar level every 5 minutes. It sends results to an electronic device such as a smart phone. A CGM can be used with or without an insulin pump. You and your diabetes care team providers will decide on the best method for you. The goal for blood sugar levels before meals  is between 80 and 130 mg/dL and 2 hours after eating  is lower than 180 mg/dL. Make healthy food choices. Work with a dietitian to develop a meal plan that works for you and your schedule. A dietitian can help you learn how to eat the right amount of carbohydrates during your meals and snacks. Carbohydrates can raise your blood sugar level if you eat too many at one time. Examples of foods that contain carbohydrates are breads, cereals, rice, pasta, and sweets. Eat high-fiber foods as directed. Fiber helps improve blood sugar levels. Fiber also lowers your risk for heart disease and other problems diabetes can cause. Examples of high-fiber foods include vegetables, whole-grain bread, and beans such as guardado beans. Your dietitian can tell you how much fiber to have each day. Get regular physical activity. Physical activity can help you get to your target blood sugar level goal and manage your weight. Get at least 150 minutes of moderate to vigorous aerobic physical activity each week. Do not miss more than 2 days in a row. Do not sit longer than 30 minutes at a time. Your healthcare provider can help you create an activity plan. The plan can include the best activities for you and can help you build your strength and endurance. Maintain a healthy weight. Ask your team what a healthy weight is for you. A healthy weight can help you control diabetes and prevent heart disease. Ask your team to help you create a weight loss plan, if needed. Weight loss can help make a difference in managing diabetes.  Your team will help you set a weight-loss goal, such as 10 to 15 pounds, or 5% of your extra weight. Together you and your team can set manageable weight loss goals. Take your diabetes medicine or insulin as directed. You may need diabetes medicine, insulin, or both to help control your blood sugar levels. Your healthcare provider will teach you how and when to take your diabetes medicine or insulin. You will also be taught about side effects oral diabetes medicine can cause. Insulin may be injected or given through a pump or pen. You and your providers will decide on the best method for you: An insulin pump  is an implanted device that gives your insulin 24 hours a day. An insulin pump prevents the need for multiple insulin injections in a day. An insulin pen  is a device prefilled with the right amount of insulin. You and your family members will be taught how to draw up and give insulin  if this is the best method for you. Your providers will also teach you how to dispose of needles and syringes. You will learn how much insulin you need  and when to give it. You will be taught when not to give insulin. You will also be taught what to do if your blood sugar level drops too low. This may happen if you take insulin and do not eat the right amount of carbohydrates. More ways to manage type 2 diabetes:   Wear medical alert identification. Wear medical alert jewelry or carry a card that says you have diabetes. Ask your provider where to get these items. Do not smoke. Nicotine and other chemicals in cigarettes and cigars can cause lung and blood vessel damage. It also makes it more difficult to manage your diabetes. Ask your provider for information if you currently smoke and need help to quit. Do not use e-cigarettes or smokeless tobacco in place of cigarettes or to help you quit. They still contain nicotine. Check your feet each day for cuts, scratches, calluses, or other wounds.   Look for redness and swelling, and feel for warmth. Wear shoes that fit well. Check your shoes for rocks or other objects that can hurt your feet. Do not walk barefoot or wear shoes without socks. Wear cotton socks to help keep your feet dry. Ask about vaccines you may need. You have a higher risk for serious illness if you get the flu, pneumonia, COVID-19, or hepatitis. Ask your provider if you should get vaccines to prevent these or other diseases, and when to get the vaccines. Talk to your provider if you become stressed about diabetes care. Sometimes being able to fit diabetes care into your life can cause increased stress. The stress can cause you not to take care of yourself properly. Your care team providers can help by offering tips about self-care. Your providers may suggest you talk to a mental health provider who can listen and offer help with self-care issues. Have your A1c checked as directed. Your provider may check your A1c every 3 months, or 2 times each year if your diabetes is controlled. An A1c test shows the average amount of sugar in your blood over the past 2 to 3 months. Your provider will tell you what your A1c level should be. Have screening tests as directed. Your provider may recommend screening for complications of diabetes and other conditions that may develop. Some screenings may begin right away and some may happen within the first 5 years of diagnosis:    Examples of diabetes complications  include kidney problems, high cholesterol, high blood pressure, blood vessel problems, eye problems, and sleep apnea. You may be screened for a low vitamin B level  if you take oral diabetes medicine for a long time. Women of childbearing years may be screened  for polycystic ovarian syndrome (PCOS). Follow up with your doctor or diabetes care team providers as directed: You may need to have blood tests done before your follow-up visit.  The test results will show if changes need to be made in your treatment or self-care. Talk to your provider if you cannot afford your medicine. Write down your questions so you remember to ask them during your visits. © Copyright [employer] 2022 Information is for End User's use only and may not be sold, redistributed or otherwise used for commercial purposes. The above information is an  only. It is not intended as medical advice for individual conditions or treatments. Talk to your doctor, nurse or pharmacist before following any medical regimen to see if it is safe and effective for you.

## 2023-07-20 NOTE — ASSESSMENT & PLAN NOTE
Doing well, has not missed any doses of eliquis. Will lower dose of metoprolol to 50 bid because mariela feels that his heart has been very low, please wear holter, will call with results. Follow up in 2 weeks.

## 2023-07-20 NOTE — PROGRESS NOTES
Assessment/Plan:         Problem List Items Addressed This Visit        Endocrine    Diabetes mellitus ([address]) - Primary     Will repeat A1C and switch from metformin to jardiance, continue to monitor blood sugars and will start statin. Follow up in 2 weeks. Lab Results   Component Value Date    HGBA1C 9.8 (H) 04/13/2023            Relevant Medications    Empagliflozin (Jardiance) 10 MG TABS tablet    rosuvastatin (CRESTOR) 5 mg tablet    Other Relevant Orders    CBC and differential    Comprehensive metabolic panel    Lipid panel    HEMOGLOBIN A1C W/ EAG ESTIMATION       Cardiovascular and Mediastinum    Atrial fibrillation with RVR (HCC)     Doing well, has not missed any doses of eliquis. Will lower dose of metoprolol to 50 bid because mariela feels that his heart has been very low, please wear holter, will call with results. Follow up in 2 weeks. Relevant Medications    metoprolol tartrate (LOPRESSOR) 50 mg tablet    Hypertension     Will add losartan, heart health diet. Follow up in 2 weeks. Relevant Medications    losartan (COZAAR) 25 mg tablet    metoprolol tartrate (LOPRESSOR) 50 mg tablet    Aneurysm of ascending aorta without rupture (HCC)     Reviewed previous imaging, Aortic root 4 cm. Ascending aorta 4.1 cm. Is appropriate tapering through the arch and   descending thoracic aorta. Visit scheduled with cardiology next week, will continue to monitor. Did have recent echo as well. Other Visit Diagnoses     Vitamin D deficiency        Have lab work will call with results. Relevant Orders    Vitamin D 25 hydroxy    Shortness of breath        Will add albuterol as needed. Follow up as needed. Relevant Medications    albuterol (Ventolin HFA) 90 mcg/act inhaler            Subjective:      Patient ID: Maru Mustafa is a 74 y.o. male. Maru Mustafa is here to establish. He was recently admitted for afib. He was treated for lyme disease.  He did also recently have a hernia surgery and did well. He has had some problems breathing since being on metroprolol, he also has not seen any change in sugar since starting metformin. Pam Seals was smoking about 6 cigaretts a day but quit completely 2 weeks ago. The following portions of the patient's history were reviewed and updated as appropriate:   Past Medical History:  He has a past medical history of Diabetes mellitus ([address]), Hypertension, and Meniere's disease.,  _______________________________________________________________________  Medical Problems:  does not have any pertinent problems on file.,  _______________________________________________________________________  Past Surgical History:   has a past surgical history that includes Hernia repair. ,  _______________________________________________________________________  Family History:  family history is not on file.,  _______________________________________________________________________  Social History:   reports that he has been smoking cigarettes. He has never used smokeless tobacco. He reports that he does not drink alcohol and does not use drugs. ,  _______________________________________________________________________  Allergies:  has No Known Allergies. .  _______________________________________________________________________  Current Outpatient Medications   Medication Sig Dispense Refill   • albuterol (Ventolin HFA) 90 mcg/act inhaler Inhale 2 puffs every 6 (six) hours as needed for wheezing 18 g 5   • doxycycline hyclate (VIBRAMYCIN) 100 mg capsule Take 1 capsule (100 mg total) by mouth every 12 (twelve) hours for 10 days 20 capsule 0   • Eliquis 5 MG TAKE 1 TABLET BY MOUTH TWICE A DAY 60 tablet 0   • Empagliflozin (Jardiance) 10 MG TABS tablet Take 1 tablet (10 mg total) by mouth every morning Consider holding at least 3 days prior to any surgery 30 tablet 3   • losartan (COZAAR) 25 mg tablet Take 1 tablet (25 mg total) by mouth daily 90 tablet 1   • metoprolol tartrate (LOPRESSOR) 50 mg tablet Take 1 tablet (50 mg total) by mouth every 12 (twelve) hours 30 tablet 5   • rosuvastatin (CRESTOR) 5 mg tablet Take 1 tablet (5 mg total) by mouth every evening 90 tablet 1     No current facility-administered medications for this visit.     _______________________________________________________________________  Review of Systems   Constitutional: Positive for fatigue. Negative for chills and fever. HENT: Negative for congestion, ear pain, postnasal drip, rhinorrhea, sinus pressure, sinus pain and sore throat. Eyes: Negative for pain and visual disturbance. Respiratory: Positive for cough, chest tightness, shortness of breath and wheezing. Cardiovascular: Positive for palpitations. Negative for chest pain. Gastrointestinal: Negative for abdominal pain, constipation, diarrhea, nausea and vomiting. Genitourinary: Negative for dysuria and hematuria. Musculoskeletal: Negative for arthralgias, back pain and myalgias. Skin: Negative for color change and rash. Neurological: Positive for dizziness. Negative for seizures, syncope, light-headedness and headaches. Psychiatric/Behavioral: Negative for dysphoric mood and sleep disturbance. The patient is not nervous/anxious. All other systems reviewed and are negative. Objective:  Vitals:    07/20/23 0904   BP: 148/72   BP Location: Left arm   Patient Position: Sitting   Cuff Size: Standard   Pulse: 75   Temp: 97.7 °F (36.5 °C)   SpO2: 95%   Weight: 93 kg (205 lb)   Height: 6' (1.829 m)     Body mass index is 27.8 kg/m². Physical Exam  Vitals and nursing note reviewed. Constitutional:       General: He is not in acute distress. Appearance: Normal appearance. He is not ill-appearing. HENT:      Head: Normocephalic. Right Ear: Tympanic membrane, ear canal and external ear normal. There is no impacted cerumen.       Left Ear: Tympanic membrane, ear canal and external ear normal. There is no impacted cerumen. Nose: Nose normal.      Mouth/Throat:      Mouth: Mucous membranes are moist.      Pharynx: No posterior oropharyngeal erythema. Eyes:      General:         Right eye: No discharge. Left eye: No discharge. Extraocular Movements: Extraocular movements intact. Conjunctiva/sclera: Conjunctivae normal.      Pupils: Pupils are equal, round, and reactive to light. Cardiovascular:      Rate and Rhythm: Normal rate and regular rhythm. Pulses: Normal pulses. Heart sounds: Normal heart sounds. No murmur heard. Pulmonary:      Effort: Pulmonary effort is normal. No respiratory distress. Breath sounds: Normal breath sounds. No wheezing. Abdominal:      General: Abdomen is flat. Bowel sounds are normal.   Musculoskeletal:         General: Normal range of motion. Cervical back: Normal range of motion. Right lower leg: No edema. Left lower leg: No edema. Lymphadenopathy:      Cervical: No cervical adenopathy. Skin:     General: Skin is warm and dry. Neurological:      General: No focal deficit present. Mental Status: He is alert and oriented to person, place, and time.    Psychiatric:         Mood and Affect: Mood normal.         Behavior: Behavior normal.

## 2023-07-20 NOTE — TELEPHONE ENCOUNTER
----- Message from Richie Caballero sent at 7/19/2023  1:57 PM EDT -----  Regarding: Care Gap Request  07/19/23 1:57 PM    Hello, our patient Adam Pennington has had CRC: Colonoscopy completed/performed. Please assist in updating the patient chart by making an External outreach to [facility] facility on 02/10/2020.     Thank you,  Richie Caballero CCMA

## 2023-07-24 LAB — MISCELLANEOUS LAB TEST RESULT: NORMAL

## 2023-07-26 ENCOUNTER — HOSPITAL ENCOUNTER (OUTPATIENT)
Dept: NON INVASIVE DIAGNOSTICS | Facility: HOSPITAL | Age: 68
Discharge: HOME | End: 2023-07-26
Payer: COMMERCIAL

## 2023-07-26 ENCOUNTER — APPOINTMENT (OUTPATIENT)
Dept: LAB | Facility: HOSPITAL | Age: 68
End: 2023-07-26
Payer: COMMERCIAL

## 2023-07-26 DIAGNOSIS — E55.9 VITAMIN D DEFICIENCY: ICD-10-CM

## 2023-07-26 DIAGNOSIS — E11.65 TYPE 2 DIABETES MELLITUS WITH HYPERGLYCEMIA, WITHOUT LONG-TERM CURRENT USE OF INSULIN (HCC): ICD-10-CM

## 2023-07-26 DIAGNOSIS — I48.91 NEW ONSET ATRIAL FIBRILLATION (HCC): ICD-10-CM

## 2023-07-26 LAB
25(OH)D3 SERPL-MCNC: 29.9 NG/ML (ref 30–100)
ALBUMIN SERPL BCP-MCNC: 4.3 G/DL (ref 3.5–5)
ALP SERPL-CCNC: 111 U/L (ref 34–104)
ALT SERPL W P-5'-P-CCNC: 17 U/L (ref 7–52)
ANION GAP SERPL CALCULATED.3IONS-SCNC: 6 MMOL/L
AST SERPL W P-5'-P-CCNC: 11 U/L (ref 13–39)
BASOPHILS # BLD AUTO: 0.11 THOUSANDS/ÂΜL (ref 0–0.1)
BASOPHILS NFR BLD AUTO: 1 % (ref 0–1)
BILIRUB SERPL-MCNC: 0.45 MG/DL (ref 0.2–1)
BUN SERPL-MCNC: 21 MG/DL (ref 5–25)
CALCIUM SERPL-MCNC: 9.6 MG/DL (ref 8.4–10.2)
CHLORIDE SERPL-SCNC: 104 MMOL/L (ref 96–108)
CHOLEST SERPL-MCNC: 166 MG/DL
CO2 SERPL-SCNC: 27 MMOL/L (ref 21–32)
CREAT SERPL-MCNC: 1.26 MG/DL (ref 0.6–1.3)
EOSINOPHIL # BLD AUTO: 0.33 THOUSAND/ÂΜL (ref 0–0.61)
EOSINOPHIL NFR BLD AUTO: 4 % (ref 0–6)
ERYTHROCYTE [DISTWIDTH] IN BLOOD BY AUTOMATED COUNT: 12.5 % (ref 11.6–15.1)
EST. AVERAGE GLUCOSE BLD GHB EST-MCNC: 203 MG/DL
GFR SERPL CREATININE-BSD FRML MDRD: 59 ML/MIN/1.73SQ M
GLUCOSE P FAST SERPL-MCNC: 202 MG/DL (ref 65–99)
HBA1C MFR BLD: 8.7 %
HCT VFR BLD AUTO: 48 % (ref 36.5–49.3)
HDLC SERPL-MCNC: 39 MG/DL
HGB BLD-MCNC: 16 G/DL (ref 12–17)
IMM GRANULOCYTES # BLD AUTO: 0.08 THOUSAND/UL (ref 0–0.2)
IMM GRANULOCYTES NFR BLD AUTO: 1 % (ref 0–2)
LDLC SERPL CALC-MCNC: 108 MG/DL (ref 0–100)
LYMPHOCYTES # BLD AUTO: 3.01 THOUSANDS/ÂΜL (ref 0.6–4.47)
LYMPHOCYTES NFR BLD AUTO: 32 % (ref 14–44)
MCH RBC QN AUTO: 31 PG (ref 26.8–34.3)
MCHC RBC AUTO-ENTMCNC: 33.3 G/DL (ref 31.4–37.4)
MCV RBC AUTO: 93 FL (ref 82–98)
MONOCYTES # BLD AUTO: 0.7 THOUSAND/ÂΜL (ref 0.17–1.22)
MONOCYTES NFR BLD AUTO: 7 % (ref 4–12)
NEUTROPHILS # BLD AUTO: 5.31 THOUSANDS/ÂΜL (ref 1.85–7.62)
NEUTS SEG NFR BLD AUTO: 55 % (ref 43–75)
NONHDLC SERPL-MCNC: 127 MG/DL
NRBC BLD AUTO-RTO: 0 /100 WBCS
PLATELET # BLD AUTO: 181 THOUSANDS/UL (ref 149–390)
PMV BLD AUTO: 9.8 FL (ref 8.9–12.7)
POTASSIUM SERPL-SCNC: 4.4 MMOL/L (ref 3.5–5.3)
PROT SERPL-MCNC: 7.2 G/DL (ref 6.4–8.4)
RBC # BLD AUTO: 5.16 MILLION/UL (ref 3.88–5.62)
SODIUM SERPL-SCNC: 137 MMOL/L (ref 135–147)
TRIGL SERPL-MCNC: 94 MG/DL
WBC # BLD AUTO: 9.54 THOUSAND/UL (ref 4.31–10.16)

## 2023-07-26 PROCEDURE — 82306 VITAMIN D 25 HYDROXY: CPT

## 2023-07-26 PROCEDURE — 3052F HG A1C>EQUAL 8.0%<EQUAL 9.0%: CPT | Performed by: PHYSICIAN ASSISTANT

## 2023-07-26 PROCEDURE — 93225 XTRNL ECG REC<48 HRS REC: CPT

## 2023-07-26 PROCEDURE — 80053 COMPREHEN METABOLIC PANEL: CPT

## 2023-07-26 PROCEDURE — 36415 COLL VENOUS BLD VENIPUNCTURE: CPT

## 2023-07-26 PROCEDURE — 93226 XTRNL ECG REC<48 HR SCAN A/R: CPT

## 2023-07-26 PROCEDURE — 80061 LIPID PANEL: CPT

## 2023-07-26 PROCEDURE — 85025 COMPLETE CBC W/AUTO DIFF WBC: CPT

## 2023-07-26 PROCEDURE — 83036 HEMOGLOBIN GLYCOSYLATED A1C: CPT

## 2023-07-27 ENCOUNTER — TELEPHONE (OUTPATIENT)
Dept: FAMILY MEDICINE CLINIC | Facility: CLINIC | Age: 68
End: 2023-07-27

## 2023-07-27 DIAGNOSIS — A69.20 LYME DISEASE: Primary | ICD-10-CM

## 2023-07-27 RX ORDER — DOXYCYCLINE HYCLATE 100 MG/1
100 CAPSULE ORAL EVERY 12 HOURS SCHEDULED
Qty: 22 CAPSULE | Refills: 0 | Status: SHIPPED | OUTPATIENT
Start: 2023-07-27 | End: 2023-08-08

## 2023-07-27 NOTE — TELEPHONE ENCOUNTER
Patient's wife called. Patient tested positive for Lyme on July 11th. The E/R placed him on antibiotic. He was on it for 10 days and now finished. Still feeling tired. Wife asking if there is anything else he needs to do. Retest, etc?  Please advise patient.

## 2023-07-28 ENCOUNTER — OFFICE VISIT (OUTPATIENT)
Dept: CARDIOLOGY CLINIC | Facility: CLINIC | Age: 68
End: 2023-07-28
Payer: COMMERCIAL

## 2023-07-28 VITALS
HEART RATE: 61 BPM | SYSTOLIC BLOOD PRESSURE: 138 MMHG | DIASTOLIC BLOOD PRESSURE: 68 MMHG | OXYGEN SATURATION: 96 % | WEIGHT: 205 LBS | HEIGHT: 73 IN | RESPIRATION RATE: 16 BRPM | BODY MASS INDEX: 27.17 KG/M2

## 2023-07-28 DIAGNOSIS — I48.0 PAF (PAROXYSMAL ATRIAL FIBRILLATION) (HCC): Primary | ICD-10-CM

## 2023-07-28 DIAGNOSIS — I10 PRIMARY HYPERTENSION: ICD-10-CM

## 2023-07-28 DIAGNOSIS — Z79.01 CHRONIC ANTICOAGULATION: ICD-10-CM

## 2023-07-28 DIAGNOSIS — E78.00 PURE HYPERCHOLESTEROLEMIA: ICD-10-CM

## 2023-07-28 DIAGNOSIS — I05.9 MITRAL VALVE DISORDER: ICD-10-CM

## 2023-07-28 PROCEDURE — 99214 OFFICE O/P EST MOD 30 MIN: CPT | Performed by: PHYSICIAN ASSISTANT

## 2023-07-28 RX ORDER — LOSARTAN POTASSIUM 25 MG/1
25 TABLET ORAL DAILY
Qty: 90 TABLET | Refills: 3 | Status: SHIPPED | OUTPATIENT
Start: 2023-07-28

## 2023-07-28 RX ORDER — ROSUVASTATIN CALCIUM 5 MG/1
5 TABLET, COATED ORAL EVERY EVENING
Qty: 90 TABLET | Refills: 3 | Status: SHIPPED | OUTPATIENT
Start: 2023-07-28 | End: 2024-01-24

## 2023-07-28 RX ORDER — METOPROLOL TARTRATE 50 MG/1
50 TABLET, FILM COATED ORAL EVERY 12 HOURS
Qty: 180 TABLET | Refills: 3 | Status: SHIPPED | OUTPATIENT
Start: 2023-07-28

## 2023-07-28 NOTE — PATIENT INSTRUCTIONS
Continue medications. Holter monitor pending. Report any bleeding. Patient currently being treated for Lyme disease, on doxycycline per PCP. Reviewed echocardiogram/BONY. Report any symptoms. Continue all medications. Previous studies reviewed with patient, medications reviewed and possible side effects discussed. Continue risk factor modification. Optimize weight, regular exercise and follow up with appropriate specialists and primary care physician as discussed. All questions answered. Patient advised to report any problems prompting to medical attention.  Return for follow up visit in 3 months or earlier if needed

## 2023-07-28 NOTE — PROGRESS NOTES
[facility]  [place] [id number]  Cardiology Follow Up    Adam Pennington  1955  [id number]    1. PAF (paroxysmal atrial fibrillation) (HCC)  apixaban (Eliquis) 5 mg    metoprolol tartrate (LOPRESSOR) 50 mg tablet      2. Primary hypertension  losartan (COZAAR) 25 mg tablet      3. Pure hypercholesterolemia  rosuvastatin (CRESTOR) 5 mg tablet      4. Mitral valve disorder        5. Chronic anticoagulation            Discussion/Summary:  1. Paroxysmal atrial fibrillation, heart rate stable in the 60s. Patient returned Holter monitor just this morning wore it for 48 hours. Those results are pending. 2.  Hypertension, stable 138/68, continue with losartan, metoprolol    3. Hyperlipidemia on Crestor     4. 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 Discussed these findings with the patient. Discussed that he may need MVR sometime in his life. 5.  Chronic anticoagulation on Eliquis. Denies any bleeding troubles. Continue medications. Holter monitor pending. Report any bleeding. Patient currently being treated for Lyme disease, on doxycycline per PCP. Reviewed echocardiogram/BONY. Report any symptoms. Continue all medications. Previous studies reviewed with patient, medications reviewed and possible side effects discussed. Continue risk factor modification. Optimize weight, regular exercise and follow up with appropriate specialists and primary care physician as discussed. All questions answered. Patient advised to report any problems prompting to medical attention.  Return for follow up visit in 3 months or earlier if needed    Chief Complaint   Patient presents with   • Follow-up       Interval History: Patient presents the office today for follow-up visit after recent hospitalization. Patient had gone to the hospital with complaints of left shoulder pain and right arm pain. Upon initial evaluation in the emergency room patient found to have new onset of atrial fibrillation with RVR. Echocardiogram done showed mitral valve leaflet thickening versus vegetation. 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 Patient eventually discharged. Patient states he is overall feeling well since discharge except he is fatigued and noted some palpitations. Patient just dropped off Holter monitor that he wore for 48 hours. Patient recently seen by PCP found to have positive Lyme titer from hospitalization, started on doxycycline. Patient denies any chest pain, chest pressure, chest heaviness. Is taking her medications without side effect. Denies any bleeding troubles on Eliquis.         Patient Active Problem List   Diagnosis   • Diabetes mellitus ([facility])   • Hyponatremia   • Abnormal LFTs   • Atrial fibrillation with RVR ([facility])   • Acute pain of left shoulder   • Hypertension   • Wrist pain   • Aneurysm of ascending aorta without rupture ([facility])     Past Medical History:   Diagnosis Date   • Diabetes mellitus ([address])    • Hypertension    • Meniere's disease      Social History     Socioeconomic History   • Marital status: /Civil Union     Spouse name: Not on file   • Number of children: Not on file   • Years of education: Not on file   • Highest education level: Not on file   Occupational History   • Not on file   Tobacco Use   • Smoking status: Some Days     Types: Cigarettes   • Smokeless tobacco: Never   Vaping Use   • Vaping Use: Not on file   Substance and Sexual Activity   • Alcohol use: Never   • Drug use: Never   • Sexual activity: Not on file   Other Topics Concern • Not on file   Social History Narrative   • Not on file     Social Determinants of Health     Financial Resource Strain: Not on file   Food Insecurity: No Food Insecurity (7/11/2023)    Hunger Vital Sign    • Worried About Running Out of Food in the Last Year: Never true    • Ran Out of Food in the Last Year: Never true   Transportation Needs: No Transportation Needs (7/11/2023)    PRAPARE - Transportation    • Lack of Transportation (Medical): No    • Lack of Transportation (Non-Medical): No   Physical Activity: Not on file   Stress: Not on file   Social Connections: Not on file   Intimate Partner Violence: Not on file   Housing Stability: Unknown (7/11/2023)    Housing Stability Vital Sign    • Unable to Pay for Housing in the Last Year: No    • Number of Places Lived in the Last Year: Not on file    • Unstable Housing in the Last Year: No      History reviewed. No pertinent family history.   Past Surgical History:   Procedure Laterality Date   • HERNIA REPAIR         Current Outpatient Medications:   •  albuterol (Ventolin HFA) 90 mcg/act inhaler, Inhale 2 puffs every 6 (six) hours as needed for wheezing, Disp: 18 g, Rfl: 5  •  apixaban (Eliquis) 5 mg, Take 1 tablet (5 mg total) by mouth 2 (two) times a day, Disp: 180 tablet, Rfl: 3  •  doxycycline hyclate (VIBRAMYCIN) 100 mg capsule, Take 1 capsule (100 mg total) by mouth every 12 (twelve) hours for 11 days, Disp: 22 capsule, Rfl: 0  •  Empagliflozin (Jardiance) 10 MG TABS tablet, Take 1 tablet (10 mg total) by mouth every morning Consider holding at least 3 days prior to any surgery, Disp: 30 tablet, Rfl: 3  •  losartan (COZAAR) 25 mg tablet, Take 1 tablet (25 mg total) by mouth daily, Disp: 90 tablet, Rfl: 3  •  metoprolol tartrate (LOPRESSOR) 50 mg tablet, Take 1 tablet (50 mg total) by mouth every 12 (twelve) hours, Disp: 180 tablet, Rfl: 3  •  rosuvastatin (CRESTOR) 5 mg tablet, Take 1 tablet (5 mg total) by mouth every evening, Disp: 90 tablet, Rfl: 3  No Known Allergies      Imaging: BONY    Result Date: 7/11/2023  Narrative: •  Left Ventricle: Left ventricular cavity size is normal. Wall thickness is normal. Systolic function is normal (60%). Wall motion is normal. •  Right Ventricle: Right ventricular cavity size is normal. Systolic function is normal. •  Atrial Septum: No patent foramen ovale detected using color flow Doppler at rest. •  Left Atrial Appendage: There is normal function. There is no thrombus. •  Mitral Valve: There is calcification of the anterior mitral valve leaflet. 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 The posterior leaflet is prolapsed. There is mild to moderate regurgitation. Echo complete w/ contrast if indicated    Result Date: 7/10/2023  Narrative: •  Left Ventricle: Left ventricular cavity size is normal. Wall thickness is normal. Systolic function is normal (60%). Wall motion is normal. Diastolic function is normal. •  Aortic Valve: There is mild to moderate regurgitation. •  Mitral Valve: There is moderate sized mobile echodensity seen on the anterior leaflet margin / tip which could be leaflet thickening vs vegetation. A BONY is recommended for further evaluation. There is mild annular calcification. There is mild regurgitation. •  Tricuspid Valve: There is trace regurgitation. The right ventricular systolic pressure is normal. •  Pulmonic Valve: There is trace regurgitation. •  IVC/SVC: The inferior vena cava is dilated. Respirophasic changes were normal.     CTA ED chest PE Study    Result Date: 7/9/2023  Narrative: CTA - CHEST WITH IV CONTRAST - PULMONARY ANGIOGRAM INDICATION:   tachycardia, recent surgery. COMPARISON: None. TECHNIQUE: CTA examination of the chest was performed using angiographic technique according to a protocol specifically tailored to evaluate for pulmonary embolism. Multiplanar 2D reformatted images were created from the source data.  In addition, coronal 3D MIP postprocessing was performed on the acquisition scanner. Radiation dose length product (DLP) for this visit:  513 mGy-cm . This examination, like all CT scans performed in the [facility], was performed utilizing techniques to minimize radiation dose exposure, including the use of iterative reconstruction and automated exposure control. IV Contrast:  85 mL of iohexol (OMNIPAQUE) FINDINGS: PULMONARY ARTERIAL TREE:  No pulmonary embolus is seen on this study degraded by respiratory motion. Elevated right hemidiaphragm. LUNGS:  Lungs are clear. There is no tracheal or endobronchial lesion. PLEURA:  Unremarkable. HEART/GREAT VESSELS:  Unremarkable for patient's age. No thoracic aortic aneurysm. MEDIASTINUM AND IMAN:  Unremarkable. CHEST WALL AND LOWER NECK:   Unremarkable. VISUALIZED STRUCTURES IN THE UPPER ABDOMEN: Right adrenal nodule measuring 2.2 x 2.0 cm, indeterminate. OSSEOUS STRUCTURES:  No acute fracture or destructive osseous lesion. Impression: No PE. Workstation performed: [id number]     XR shoulder 2+ views LEFT    Result Date: 7/9/2023  Narrative: LEFT SHOULDER INDICATION:   shoulder pain. COMPARISON:  None VIEWS:  XR SHOULDER 2+ VW LEFT Images: 3 FINDINGS: There is no acute fracture or dislocation. No significant degenerative changes. No lytic or blastic osseous lesion. Calcific tendinitis supraspinatus     Impression: No acute osseous abnormality Calcific tendinitis supraspinatus. Workstation performed: [id number]       Review of Systems:  Review of Systems   Constitutional: Positive for fatigue. Respiratory: Negative. Cardiovascular: Positive for palpitations. Neurological: Negative. Hematological: Negative. Psychiatric/Behavioral: Negative. All other systems reviewed and are negative.         /68 (BP Location: Left arm, Patient Position: Sitting, Cuff Size: Standard)   Pulse 61   Resp 16   Ht 6' 1" (1.854 m)   Wt 93 kg (205 lb)   SpO2 96%   BMI 27.05 kg/m²     Physical Exam:  Physical Exam  Vitals and nursing note reviewed. Exam conducted with a chaperone present. Constitutional:       Appearance: Normal appearance. HENT:      Head: Normocephalic and atraumatic. Cardiovascular:      Rate and Rhythm: Normal rate. Rhythm irregular. Pulses: Normal pulses. Heart sounds: Normal heart sounds. Pulmonary:      Effort: Pulmonary effort is normal.      Breath sounds: Normal breath sounds. Musculoskeletal:         General: Normal range of motion. Cervical back: Normal range of motion. Skin:     General: Skin is warm and dry. Neurological:      General: No focal deficit present. Mental Status: He is alert and oriented to person, place, and time.

## 2023-08-02 ENCOUNTER — RA CDI HCC (OUTPATIENT)
Dept: OTHER | Facility: HOSPITAL | Age: 68
End: 2023-08-02

## 2023-08-02 NOTE — PROGRESS NOTES
[address] [facility] coding opportunities       Chart reviewed, no opportunity found: [id number]        Patients Insurance     Medicare Insurance: [employer] Advantage

## 2023-08-03 PROCEDURE — 93227 XTRNL ECG REC<48 HR R&I: CPT | Performed by: INTERNAL MEDICINE

## 2023-08-04 ENCOUNTER — TELEPHONE (OUTPATIENT)
Dept: FAMILY MEDICINE CLINIC | Facility: CLINIC | Age: 68
End: 2023-08-04

## 2023-08-04 DIAGNOSIS — I48.0 PAF (PAROXYSMAL ATRIAL FIBRILLATION) (HCC): ICD-10-CM

## 2023-08-07 RX ORDER — AMLODIPINE BESYLATE 2.5 MG/1
TABLET ORAL
COMMUNITY

## 2023-08-07 RX ORDER — ACYCLOVIR 400 MG/1
TABLET ORAL
COMMUNITY

## 2023-08-08 ENCOUNTER — OFFICE VISIT (OUTPATIENT)
Dept: FAMILY MEDICINE CLINIC | Facility: CLINIC | Age: 68
End: 2023-08-08
Payer: COMMERCIAL

## 2023-08-08 ENCOUNTER — TELEPHONE (OUTPATIENT)
Dept: ADMINISTRATIVE | Facility: OTHER | Age: 68
End: 2023-08-08

## 2023-08-08 VITALS
SYSTOLIC BLOOD PRESSURE: 138 MMHG | DIASTOLIC BLOOD PRESSURE: 60 MMHG | TEMPERATURE: 98.2 F | WEIGHT: 207 LBS | HEART RATE: 66 BPM | HEIGHT: 73 IN | BODY MASS INDEX: 27.43 KG/M2 | OXYGEN SATURATION: 96 %

## 2023-08-08 DIAGNOSIS — N18.31 STAGE 3A CHRONIC KIDNEY DISEASE (HCC): ICD-10-CM

## 2023-08-08 DIAGNOSIS — Z12.5 SCREENING FOR PROSTATE CANCER: ICD-10-CM

## 2023-08-08 DIAGNOSIS — E11.65 TYPE 2 DIABETES MELLITUS WITH HYPERGLYCEMIA, WITHOUT LONG-TERM CURRENT USE OF INSULIN (HCC): Primary | ICD-10-CM

## 2023-08-08 DIAGNOSIS — I48.91 ATRIAL FIBRILLATION WITH RVR (HCC): ICD-10-CM

## 2023-08-08 DIAGNOSIS — Z00.00 ENCOUNTER FOR INITIAL ANNUAL WELLNESS VISIT (AWV) IN MEDICARE PATIENT: ICD-10-CM

## 2023-08-08 PROCEDURE — 99214 OFFICE O/P EST MOD 30 MIN: CPT

## 2023-08-08 PROCEDURE — G0438 PPPS, INITIAL VISIT: HCPCS

## 2023-08-08 NOTE — PROGRESS NOTES
Assessment and Plan:     Problem List Items Addressed This Visit        Endocrine    Diabetes mellitus ([address]) - Primary     Blood sugars are better, will repeat A1C in October. Lab Results   Component Value Date    HGBA1C 8.7 (H) 07/26/2023            Relevant Orders    HEMOGLOBIN A1C W/ EAG ESTIMATION    CBC and differential    Comprehensive metabolic panel    Lipid panel       Cardiovascular and Mediastinum    Atrial fibrillation with RVR ([address])     Patient is feeling good, he would want to hold off on sleep study at this time, follow up is scheduled with cardiology. Follow up here in October. Relevant Medications    amLODIPine (NORVASC) 2.5 mg tablet       Genitourinary    Stage 3a chronic kidney disease ([address])     Lab Results   Component Value Date    EGFR 59 07/26/2023    EGFR 83 07/10/2023    EGFR 73 07/09/2023    CREATININE 1.26 07/26/2023    CREATININE 0.95 07/10/2023    CREATININE 1.06 07/09/2023   increase hydration, have lab work in October, follow up in October. Other Visit Diagnoses     Screening for prostate cancer        Have lab work, will call with results. Relevant Orders    PSA, total and free    Encounter for initial annual wellness visit (AWV) in Medicare patient        AWV today, updated as necessary. BMI Counseling: Body mass index is 27.31 kg/m². The BMI is above normal. Nutrition recommendations include decreasing portion sizes, encouraging healthy choices of fruits and vegetables, increasing intake of lean protein and reducing intake of cholesterol. Exercise recommendations include exercising 3-5 times per week. Rationale for BMI follow-up plan is due to patient being overweight or obese. Depression Screening and Follow-up Plan: Patient was screened for depression during today's encounter. They screened negative with a PHQ-2 score of 0.       Preventive health issues were discussed with patient, and age appropriate screening tests were ordered as noted in patient's After Visit Summary. Personalized health advice and appropriate referrals for health education or preventive services given if needed, as noted in patient's After Visit Summary. History of Present Illness:     Patient presents for a Medicare Wellness Visit    Malachi Sun is here for follow up, he is feeling well. Blood sugar and blood pressure is improving. Patient Care Team:  Harper Mane as PCP - General (Nurse Practitioner)     Review of Systems:     Review of Systems   Constitutional: Positive for fatigue. Negative for chills, diaphoresis and fever. HENT: Negative for congestion, ear pain, rhinorrhea, sinus pain and sore throat. Eyes: Negative for pain and visual disturbance. Respiratory: Negative for cough, chest tightness, shortness of breath and wheezing. Cardiovascular: Negative for chest pain and palpitations. Gastrointestinal: Negative for abdominal pain, constipation, diarrhea, nausea and vomiting. Genitourinary: Negative for dysuria, frequency, hematuria and urgency. Musculoskeletal: Negative for arthralgias, back pain and myalgias. Skin: Negative for color change and rash. Neurological: Negative for dizziness, seizures, syncope, light-headedness and headaches. Psychiatric/Behavioral: Negative for dysphoric mood and sleep disturbance. The patient is not nervous/anxious. All other systems reviewed and are negative.        Problem List:     Patient Active Problem List   Diagnosis   • Diabetes mellitus ([address])   • Hyponatremia   • Abnormal LFTs   • Atrial fibrillation with RVR (HCC)   • Acute pain of left shoulder   • Hypertension   • Wrist pain   • Aneurysm of ascending aorta without rupture (HCC)   • Stage 3a chronic kidney disease (HCC)      Past Medical and Surgical History:     Past Medical History:   Diagnosis Date   • Diabetes mellitus ([address])    • Hypertension    • Meniere's disease      Past Surgical History:   Procedure Laterality Date   • HERNIA REPAIR Family History:     History reviewed. No pertinent family history. Social History:     Social History     Socioeconomic History   • Marital status: /Civil Union     Spouse name: None   • Number of children: None   • Years of education: None   • Highest education level: None   Occupational History   • None   Tobacco Use   • Smoking status: Some Days     Types: Cigarettes   • Smokeless tobacco: Never   Vaping Use   • Vaping Use: None   Substance and Sexual Activity   • Alcohol use: Never   • Drug use: Never   • Sexual activity: None   Other Topics Concern   • None   Social History Narrative   • None     Social Determinants of Health     Financial Resource Strain: Low Risk  (8/8/2023)    Overall Financial Resource Strain (CARDIA)    • Difficulty of Paying Living Expenses: Not hard at all   Food Insecurity: No Food Insecurity (7/11/2023)    Hunger Vital Sign    • Worried About Running Out of Food in the Last Year: Never true    • Ran Out of Food in the Last Year: Never true   Transportation Needs: No Transportation Needs (8/8/2023)    PRAPARE - Transportation    • Lack of Transportation (Medical): No    • Lack of Transportation (Non-Medical): No   Physical Activity: Not on file   Stress: Not on file   Social Connections: Not on file   Intimate Partner Violence: Not on file   Housing Stability: Unknown (7/11/2023)    Housing Stability Vital Sign    • Unable to Pay for Housing in the Last Year: No    • Number of Places Lived in the Last Year: Not on file    • Unstable Housing in the Last Year: No      Medications and Allergies:     Current Outpatient Medications   Medication Sig Dispense Refill   • acyclovir (ZOVIRAX) 400 MG tablet Take 1 tablet every 8 hours by oral route.      • amLODIPine (NORVASC) 2.5 mg tablet      • apixaban (Eliquis) 5 mg Take 1 tablet (5 mg total) by mouth 2 (two) times a day 180 tablet 3   • doxycycline hyclate (VIBRAMYCIN) 100 mg capsule Take 1 capsule (100 mg total) by mouth every 12 (twelve) hours for 11 days 22 capsule 0   • Empagliflozin (Jardiance) 10 MG TABS tablet Take 1 tablet (10 mg total) by mouth every morning Consider holding at least 3 days prior to any surgery 30 tablet 3   • losartan (COZAAR) 25 mg tablet Take 1 tablet (25 mg total) by mouth daily 90 tablet 3   • metoprolol tartrate (LOPRESSOR) 50 mg tablet Take 1 tablet (50 mg total) by mouth every 12 (twelve) hours 180 tablet 3   • rosuvastatin (CRESTOR) 5 mg tablet Take 1 tablet (5 mg total) by mouth every evening 90 tablet 3     No current facility-administered medications for this visit. No Known Allergies   Immunizations:     Immunization History   Administered Date(s) Administered   • COVID-19 MODERNA VACC 0.5 ML IM 03/19/2021, 04/16/2021   • Tdap 02/15/2023      Health Maintenance:         Topic Date Due   • Hepatitis C Screening  Never done   • HIV Screening  Never done   • Colorectal Cancer Screening  Never done         Topic Date Due   • Pneumococcal Vaccine: 65+ Years (1 - PCV) Never done   • COVID-19 Vaccine (3 - Moderna series) 06/11/2021   • Influenza Vaccine (1) 09/01/2023      Medicare Screening Tests and Risk Assessments:     Mai Schroeder is here for his Welcome to Medicare visit. Health Risk Assessment:   Patient rates overall health as fair. Patient feels that their physical health rating is same. Patient is satisfied with their life. Eyesight was rated as same. Hearing was rated as same. Patient feels that their emotional and mental health rating is same. Patients states they are sometimes angry. Patient states they are often unusually tired/fatigued. Pain experienced in the last 7 days has been some. Patient's pain rating has been 2/10. Patient states that he has experienced weight loss or gain in last 6 months. Depression Screening:   PHQ-2 Score: 0      Fall Risk Screening:    In the past year, patient has experienced: no history of falling in past year      Home Safety:  Patient does not have trouble with stairs inside or outside of their home. Patient has working smoke alarms and has working carbon monoxide detector. Home safety hazards include: none. Nutrition:   Current diet is Limited junk food. Medications:   Patient is not currently taking any over-the-counter supplements. Patient is able to manage medications. Activities of Daily Living (ADLs)/Instrumental Activities of Daily Living (IADLs):   Walk and transfer into and out of bed and chair?: Yes  Dress and groom yourself?: Yes    Bathe or shower yourself?: Yes    Feed yourself?  Yes  Do your laundry/housekeeping?: Yes  Manage your money, pay your bills and track your expenses?: Yes  Make your own meals?: Yes    Do your own shopping?: Yes    Previous Hospitalizations:   Any hospitalizations or ED visits within the last 12 months?: Yes    How many hospitalizations have you had in the last year?: 1-2    Advance Care Planning:   Living will: No    Durable POA for healthcare: No    Advanced directive: No    Advanced directive counseling given: Yes    Five wishes given: No    Patient declined ACP directive: Yes    End of Life Decisions reviewed with patient: Yes    Provider agrees with end of life decisions: Yes      Comments: Patient would like wife to make decisions if needed    Cognitive Screening:   Provider or family/friend/caregiver concerned regarding cognition?: No    PREVENTIVE SCREENINGS      Cardiovascular Screening:    General: Screening Not Indicated and History Lipid Disorder      Diabetes Screening:     General: Screening Not Indicated and History Diabetes      Colorectal Cancer Screening:     General: Risks and Benefits Discussed    Due for: Cologuard      Prostate Cancer Screening:    General: Risks and Benefits Discussed      Osteoporosis Screening:    General: Screening Not Indicated      Abdominal Aortic Aneurysm (AAA) Screening:    Risk factors include: age between 70-75 yo and tobacco use        Lung Cancer Screening: General: Screening Not Indicated      Hepatitis C Screening:    General: Risks and Benefits Discussed    Screening, Brief Intervention, and Referral to Treatment (SBIRT)    Screening  Typical number of drinks in a day: 0  Typical number of drinks in a week: 0  Interpretation: Low risk drinking behavior. AUDIT-C Screenin) How often did you have a drink containing alcohol in the past year? never  2) How many drinks did you have on a typical day when you were drinking in the past year? 0  3) How often did you have 6 or more drinks on one occasion in the past year? never    AUDIT-C Score: 0  Interpretation: Score 0-3 (male): Negative screen for alcohol misuse    Single Item Drug Screening:  How often have you used an illegal drug (including marijuana) or a prescription medication for non-medical reasons in the past year? never    Single Item Drug Screen Score: 0  Interpretation: Negative screen for possible drug use disorder    No results found. Physical Exam:     /60 (BP Location: Right arm, Patient Position: Sitting, Cuff Size: Standard)   Pulse 66   Temp 98.2 °F (36.8 °C)   Ht 6' 1" (1.854 m)   Wt 93.9 kg (207 lb)   SpO2 96%   BMI 27.31 kg/m²     Physical Exam  Vitals and nursing note reviewed. Constitutional:       General: He is not in acute distress. Appearance: He is well-developed. He is not ill-appearing. HENT:      Head: Normocephalic and atraumatic. Right Ear: Tympanic membrane, ear canal and external ear normal. There is no impacted cerumen. Left Ear: Tympanic membrane, ear canal and external ear normal. There is no impacted cerumen. Nose: Nose normal. No congestion. Mouth/Throat:      Mouth: Mucous membranes are moist.      Pharynx: No oropharyngeal exudate or posterior oropharyngeal erythema. Eyes:      Extraocular Movements: Extraocular movements intact.       Conjunctiva/sclera: Conjunctivae normal.      Pupils: Pupils are equal, round, and reactive to light. Cardiovascular:      Rate and Rhythm: Normal rate and regular rhythm. Heart sounds: No murmur heard. Pulmonary:      Effort: Pulmonary effort is normal. No respiratory distress. Breath sounds: Normal breath sounds. Abdominal:      Palpations: Abdomen is soft. Tenderness: There is no abdominal tenderness. Musculoskeletal:         General: No swelling. Cervical back: Neck supple. Right lower leg: No edema. Left lower leg: Edema present. Lymphadenopathy:      Cervical: No cervical adenopathy. Skin:     General: Skin is warm and dry. Capillary Refill: Capillary refill takes less than 2 seconds. Neurological:      General: No focal deficit present. Mental Status: He is alert.    Psychiatric:         Mood and Affect: Mood normal.         Behavior: Behavior normal.        Diabetic Foot Exam    Diabetic Foot Exam  GRACIA Zuñiga

## 2023-08-08 NOTE — ASSESSMENT & PLAN NOTE
Lab Results   Component Value Date    EGFR 59 07/26/2023    EGFR 83 07/10/2023    EGFR 73 07/09/2023    CREATININE 1.26 07/26/2023    CREATININE 0.95 07/10/2023    CREATININE 1.06 07/09/2023   increase hydration, have lab work in October, follow up in October.

## 2023-08-08 NOTE — TELEPHONE ENCOUNTER
----- Message from Josette Espinal sent at 8/8/2023  9:56 AM EDT -----  Regarding: Care Gap Request  08/08/23 9:56 AM    Hello, our patient Bibi Box has had Diabetic Eye Exam completed/performed. Please assist in updating the patient chart by making an External outreach to Dr. Fredi Perera at [facility] located in [place], [place].      Thank you,  SIDRA Titus

## 2023-08-08 NOTE — LETTER
Diabetic Eye Exam Form    Date Requested: 23  Patient: Nahomi Webb  Patient : 1955   Referring Provider: GRACIA Palomo      DIABETIC Eye Exam Date _______________________________      Type of Exam MUST be documented for Diabetic Eye Exams. Please CHECK ONE. Retinal Exam       Dilated Retinal Exam       OCT       Optomap-Iris Exam      Fundus Photography       Left Eye - Please check Retinopathy or No Retinopathy        Exam did show retinopathy    Exam did not show retinopathy       Right Eye - Please check Retinopathy or No Retinopathy       Exam did show retinopathy    Exam did not show retinopathy       Comments __________________________________________________________    Practice Providing Exam ______________________________________________    Exam Performed By (print name) _______________________________________      Provider Signature ___________________________________________________      These reports are needed for  compliance. Please fax this completed form and a copy of the Diabetic Eye Exam report to our office located at [address] [place] [address] as soon as possible via Fax [phone] [facility] city: Phone [phone]  We thank you for your assistance in treating our mutual patient.

## 2023-08-08 NOTE — ASSESSMENT & PLAN NOTE
Patient is feeling good, he would want to hold off on sleep study at this time, follow up is scheduled with cardiology. Follow up here in October.

## 2023-08-08 NOTE — ASSESSMENT & PLAN NOTE
Blood sugars are better, will repeat A1C in October.    Lab Results   Component Value Date    HGBA1C 8.7 (H) 07/26/2023

## 2023-08-09 ENCOUNTER — PATIENT MESSAGE (OUTPATIENT)
Dept: FAMILY MEDICINE CLINIC | Facility: CLINIC | Age: 68
End: 2023-08-09

## 2023-08-09 NOTE — TELEPHONE ENCOUNTER
Upon review of the In Basket request and the patient's chart, initial outreach has been made via fax to facility. Please see Contacts section for details.      Thank you  Valentine Bell

## 2023-08-10 ENCOUNTER — TELEPHONE (OUTPATIENT)
Dept: ADMINISTRATIVE | Facility: OTHER | Age: 68
End: 2023-08-10

## 2023-08-10 ENCOUNTER — TELEPHONE (OUTPATIENT)
Dept: FAMILY MEDICINE CLINIC | Facility: CLINIC | Age: 68
End: 2023-08-10

## 2023-08-10 NOTE — TELEPHONE ENCOUNTER
Called Dr Beasley Aas office and n/a lmov for them to see if they can fax over patient last dm eye exam.

## 2023-08-10 NOTE — TELEPHONE ENCOUNTER
----- Message from Isabel Rudolph sent at 8/9/2023  3:21 PM EDT -----  Regarding: Dm eye  08/09/23 3:21 PM    Hello, our patient Evelio Irwin has had Diabetic Eye Exam completed/performed.  Please assist in updating the patient chart by making an External outreach to [facility] [phone]    Thank you,  Jamar Omalley MA   [facility]

## 2023-08-10 NOTE — LETTER
Diabetic Eye Exam Form    Date Requested: 08/10/23  Patient: Anisa Araujo  Patient : 1955   Referring Provider: GRACIA Mcmillan      DIABETIC Eye Exam Date _______________________________      Type of Exam MUST be documented for Diabetic Eye Exams. Please CHECK ONE. Retinal Exam       Dilated Retinal Exam       OCT       Optomap-Iris Exam      Fundus Photography       Left Eye - Please check Retinopathy or No Retinopathy        Exam did show retinopathy    Exam did not show retinopathy       Right Eye - Please check Retinopathy or No Retinopathy       Exam did show retinopathy    Exam did not show retinopathy       Comments __________________________________________________________    Practice Providing Exam ______________________________________________    Exam Performed By (print name) _______________________________________      Provider Signature ___________________________________________________      These reports are needed for  compliance. Please fax this completed form and a copy of the Diabetic Eye Exam report to our office located at [address] [place] [address] as soon as possible via Fax [phone] attention Jerry Harrington: Phone [phone]  We thank you for your assistance in treating our mutual patient.

## 2023-08-10 NOTE — TELEPHONE ENCOUNTER
Upon review of the In Basket request and the patient's chart, initial outreach has been made via fax to facility. Please see Contacts section for details.      Thank you  James Steele

## 2023-08-11 NOTE — TELEPHONE ENCOUNTER
Upon review of the In Basket request we have found this is a duplicate request and no further action is needed. This request is currently being processed and documentation is being completed in the initial request. This message will now be closed. Any additional questions or concerns should be emailed to the Practice Liaisons via the appropriate education email address, please do not reply via In Basket.     Thank you  Shade Betts

## 2023-08-11 NOTE — TELEPHONE ENCOUNTER
Upon review of the In Basket request we were able to locate, review, and update the patient chart as requested for Diabetic Eye Exam.    Any additional questions or concerns should be emailed to the Practice Liaisons via the appropriate education email address, please do not reply via In Basket.     Thank you  Hai Resendiz

## 2023-10-10 ENCOUNTER — PATIENT MESSAGE (OUTPATIENT)
Dept: CARDIOLOGY CLINIC | Facility: CLINIC | Age: 68
End: 2023-10-10

## 2023-10-10 ENCOUNTER — TELEPHONE (OUTPATIENT)
Dept: CARDIOLOGY CLINIC | Facility: CLINIC | Age: 68
End: 2023-10-10

## 2023-10-10 NOTE — TELEPHONE ENCOUNTER
----- Message from Yasmin Varma on behalf of Andres Bustamante sent at 10/10/2023 12:58 PM EDT -----  Regarding: Visit  Contact: [phone]  Does any tests need to be completed before this appointment?

## 2023-11-01 ENCOUNTER — RA CDI HCC (OUTPATIENT)
Dept: OTHER | Facility: HOSPITAL | Age: 68
End: 2023-11-01

## 2023-11-06 ENCOUNTER — APPOINTMENT (OUTPATIENT)
Dept: LAB | Facility: HOSPITAL | Age: 68
End: 2023-11-06
Payer: COMMERCIAL

## 2023-11-06 DIAGNOSIS — E11.65 TYPE 2 DIABETES MELLITUS WITH HYPERGLYCEMIA, WITHOUT LONG-TERM CURRENT USE OF INSULIN (HCC): ICD-10-CM

## 2023-11-06 DIAGNOSIS — Z12.5 SCREENING FOR PROSTATE CANCER: ICD-10-CM

## 2023-11-06 LAB
ALBUMIN SERPL BCP-MCNC: 4.7 G/DL (ref 3.5–5)
ALP SERPL-CCNC: 79 U/L (ref 34–104)
ALT SERPL W P-5'-P-CCNC: 26 U/L (ref 7–52)
ANION GAP SERPL CALCULATED.3IONS-SCNC: 5 MMOL/L
AST SERPL W P-5'-P-CCNC: 14 U/L (ref 13–39)
BASOPHILS # BLD AUTO: 0.07 THOUSANDS/ÂΜL (ref 0–0.1)
BASOPHILS NFR BLD AUTO: 1 % (ref 0–1)
BILIRUB SERPL-MCNC: 0.95 MG/DL (ref 0.2–1)
BUN SERPL-MCNC: 20 MG/DL (ref 5–25)
CALCIUM SERPL-MCNC: 9.9 MG/DL (ref 8.4–10.2)
CHLORIDE SERPL-SCNC: 108 MMOL/L (ref 96–108)
CHOLEST SERPL-MCNC: 139 MG/DL
CO2 SERPL-SCNC: 23 MMOL/L (ref 21–32)
CREAT SERPL-MCNC: 1.11 MG/DL (ref 0.6–1.3)
EOSINOPHIL # BLD AUTO: 0.2 THOUSAND/ÂΜL (ref 0–0.61)
EOSINOPHIL NFR BLD AUTO: 3 % (ref 0–6)
ERYTHROCYTE [DISTWIDTH] IN BLOOD BY AUTOMATED COUNT: 12.4 % (ref 11.6–15.1)
EST. AVERAGE GLUCOSE BLD GHB EST-MCNC: 183 MG/DL
GFR SERPL CREATININE-BSD FRML MDRD: 68 ML/MIN/1.73SQ M
GLUCOSE P FAST SERPL-MCNC: 144 MG/DL (ref 65–99)
HBA1C MFR BLD: 8 %
HCT VFR BLD AUTO: 53.3 % (ref 36.5–49.3)
HDLC SERPL-MCNC: 35 MG/DL
HGB BLD-MCNC: 18.4 G/DL (ref 12–17)
IMM GRANULOCYTES # BLD AUTO: 0.03 THOUSAND/UL (ref 0–0.2)
IMM GRANULOCYTES NFR BLD AUTO: 0 % (ref 0–2)
LDLC SERPL CALC-MCNC: 89 MG/DL (ref 0–100)
LYMPHOCYTES # BLD AUTO: 2.59 THOUSANDS/ÂΜL (ref 0.6–4.47)
LYMPHOCYTES NFR BLD AUTO: 33 % (ref 14–44)
MCH RBC QN AUTO: 31.6 PG (ref 26.8–34.3)
MCHC RBC AUTO-ENTMCNC: 34.5 G/DL (ref 31.4–37.4)
MCV RBC AUTO: 92 FL (ref 82–98)
MONOCYTES # BLD AUTO: 0.66 THOUSAND/ÂΜL (ref 0.17–1.22)
MONOCYTES NFR BLD AUTO: 8 % (ref 4–12)
NEUTROPHILS # BLD AUTO: 4.33 THOUSANDS/ÂΜL (ref 1.85–7.62)
NEUTS SEG NFR BLD AUTO: 55 % (ref 43–75)
NONHDLC SERPL-MCNC: 104 MG/DL
NRBC BLD AUTO-RTO: 0 /100 WBCS
PLATELET # BLD AUTO: 144 THOUSANDS/UL (ref 149–390)
PMV BLD AUTO: 9.5 FL (ref 8.9–12.7)
POTASSIUM SERPL-SCNC: 4.3 MMOL/L (ref 3.5–5.3)
PROT SERPL-MCNC: 7 G/DL (ref 6.4–8.4)
RBC # BLD AUTO: 5.82 MILLION/UL (ref 3.88–5.62)
SODIUM SERPL-SCNC: 136 MMOL/L (ref 135–147)
TRIGL SERPL-MCNC: 73 MG/DL
WBC # BLD AUTO: 7.88 THOUSAND/UL (ref 4.31–10.16)

## 2023-11-06 PROCEDURE — 36415 COLL VENOUS BLD VENIPUNCTURE: CPT

## 2023-11-06 PROCEDURE — 80053 COMPREHEN METABOLIC PANEL: CPT

## 2023-11-06 PROCEDURE — 85025 COMPLETE CBC W/AUTO DIFF WBC: CPT

## 2023-11-06 PROCEDURE — 80061 LIPID PANEL: CPT

## 2023-11-06 PROCEDURE — 83036 HEMOGLOBIN GLYCOSYLATED A1C: CPT

## 2023-11-06 PROCEDURE — 84154 ASSAY OF PSA FREE: CPT

## 2023-11-06 PROCEDURE — 84153 ASSAY OF PSA TOTAL: CPT

## 2023-11-07 LAB
PSA FREE MFR SERPL: 18.6 %
PSA FREE SERPL-MCNC: 0.26 NG/ML
PSA SERPL-MCNC: 1.4 NG/ML (ref 0–4)

## 2023-11-08 ENCOUNTER — OFFICE VISIT (OUTPATIENT)
Dept: FAMILY MEDICINE CLINIC | Facility: CLINIC | Age: 68
End: 2023-11-08
Payer: COMMERCIAL

## 2023-11-08 VITALS
TEMPERATURE: 96.7 F | HEIGHT: 73 IN | OXYGEN SATURATION: 96 % | WEIGHT: 213.6 LBS | HEART RATE: 64 BPM | SYSTOLIC BLOOD PRESSURE: 142 MMHG | BODY MASS INDEX: 28.31 KG/M2 | DIASTOLIC BLOOD PRESSURE: 72 MMHG

## 2023-11-08 DIAGNOSIS — I83.213: ICD-10-CM

## 2023-11-08 DIAGNOSIS — H04.129 DRY EYE: Primary | ICD-10-CM

## 2023-11-08 DIAGNOSIS — D69.6 PLATELETS DECREASED (HCC): ICD-10-CM

## 2023-11-08 DIAGNOSIS — I48.0 PAF (PAROXYSMAL ATRIAL FIBRILLATION) (HCC): ICD-10-CM

## 2023-11-08 DIAGNOSIS — H81.03 MENIERE'S DISEASE OF BOTH EARS: ICD-10-CM

## 2023-11-08 DIAGNOSIS — L97.318: ICD-10-CM

## 2023-11-08 PROBLEM — I83.90 VARICOSE VEINS OF LOWER EXTREMITY: Status: ACTIVE | Noted: 2023-11-08

## 2023-11-08 PROCEDURE — 99214 OFFICE O/P EST MOD 30 MIN: CPT

## 2023-11-08 RX ORDER — METOPROLOL TARTRATE 50 MG/1
75 TABLET, FILM COATED ORAL EVERY 12 HOURS
Qty: 180 TABLET | Refills: 3
Start: 2023-11-08

## 2023-11-08 RX ORDER — DIAZEPAM 5 MG/1
5 TABLET ORAL EVERY 12 HOURS PRN
Qty: 60 TABLET | Refills: 0 | Status: SHIPPED | OUTPATIENT
Start: 2023-11-08

## 2023-11-08 NOTE — PROGRESS NOTES
Diabetic Foot Exam    Patient's shoes and socks removed. Right Foot/Ankle   Right Foot Inspection  Skin Exam: skin normal, callus and callus. Skin not intact, no dry skin, no warmth, no erythema, no maceration, no abnormal color, no pre-ulcer and no ulcer. Toe Exam: ROM and strength within normal limits. Sensory   Proprioception: intact  Monofilament testing: intact    Vascular  Capillary refills: < 3 seconds  The right DP pulse is 2+. The right PT pulse is 2+. Left Foot/Ankle  Left Foot Inspection  Skin Exam: skin normal and callus. Skin not intact, no dry skin, no warmth, no erythema, no maceration, normal color, no pre-ulcer and no ulcer. Toe Exam: ROM and strength within normal limits. Sensory   Proprioception: intact  Monofilament testing: intact    Vascular  Capillary refills: < 3 seconds  The left DP pulse is 2+. The left PT pulse is 2+. Assign Risk Category  No deformity present  No loss of protective sensation  No weak pulses  Risk: 0  Assessment/Plan:         Problem List Items Addressed This Visit        Cardiovascular and Mediastinum    PAF (paroxysmal atrial fibrillation) [facility])     Appointment with cardiology on Friday, does have palps with strenuous exercise. Continue current meds, reviewed lab work, improved. Relevant Medications    metoprolol tartrate (LOPRESSOR) 50 mg tablet    Varicose veins of lower extremity     Please have ultrasound of lower extremity, will call with results. Did have varicose veins removed in the past. Now with pain. Relevant Orders    VAS reflux lower limb venous duplex study with reflux assessment, complete bilateral       Nervous and Auditory    Meniere's disease of both ears     Will treat with low dose valium, will call with results. Relevant Medications    diazepam (VALIUM) 5 mg tablet       Other    Platelets decreased ([address])     Reviewed lab work, stable, will continue to monitor.           Dry eye - Primary     Will treat with xiidra, follow up as needed. Relevant Medications    lifitegrast (XIIDRA) 5 % op solution         Subjective:      Patient ID: Ursula Goncalves is a 79 y.o. male. Zbe Sierra is here for follow up, he is suffering with ear ringing and dry eye. He also has noticed a lot of fluctuations in his HR. The following portions of the patient's history were reviewed and updated as appropriate:   Past Medical History:  He has a past medical history of Diabetes mellitus ([address]), Hypertension, and Meniere's disease.,  _______________________________________________________________________  Medical Problems:  does not have any pertinent problems on file.,  _______________________________________________________________________  Past Surgical History:   has a past surgical history that includes Hernia repair. ,  _______________________________________________________________________  Family History:  family history is not on file.,  _______________________________________________________________________  Social History:   reports that he has been smoking cigarettes. He has never used smokeless tobacco. He reports that he does not drink alcohol and does not use drugs. ,  _______________________________________________________________________  Allergies:  has No Known Allergies. .  _______________________________________________________________________  Current Outpatient Medications   Medication Sig Dispense Refill   • apixaban (Eliquis) 5 mg Take 1 tablet (5 mg total) by mouth 2 (two) times a day 180 tablet 3   • diazepam (VALIUM) 5 mg tablet Take 1 tablet (5 mg total) by mouth every 12 (twelve) hours as needed for anxiety 60 tablet 0   • Empagliflozin (Jardiance) 10 MG TABS tablet Take 1 tablet (10 mg total) by mouth every morning Consider holding at least 3 days prior to any surgery 30 tablet 3   • lifitegrast (XIIDRA) 5 % op solution Administer 1 drop to both eyes 2 (two) times a day 12 each 3   • losartan (COZAAR) 25 mg tablet Take 1 tablet (25 mg total) by mouth daily 90 tablet 3   • metoprolol tartrate (LOPRESSOR) 50 mg tablet Take 1.5 tablets (75 mg total) by mouth every 12 (twelve) hours 180 tablet 3   • rosuvastatin (CRESTOR) 5 mg tablet Take 1 tablet (5 mg total) by mouth every evening 90 tablet 3     No current facility-administered medications for this visit.     _______________________________________________________________________  Review of Systems   Constitutional:  Positive for fatigue. Negative for chills and fever. HENT:  Negative for ear pain and sore throat. Eyes:  Negative for pain and visual disturbance. Respiratory:  Positive for shortness of breath. Negative for cough, chest tightness and wheezing. Cardiovascular:  Positive for palpitations. Negative for chest pain. Gastrointestinal:  Negative for abdominal pain, diarrhea, nausea and vomiting. Genitourinary:  Positive for frequency. Negative for dysuria and hematuria. Musculoskeletal:  Negative for arthralgias, back pain and myalgias. Skin:  Negative for color change and rash. Neurological:  Positive for dizziness and light-headedness. Negative for seizures, syncope and headaches. Psychiatric/Behavioral:  Positive for sleep disturbance. Negative for dysphoric mood. The patient is not nervous/anxious. All other systems reviewed and are negative. Objective:  Vitals:    11/08/23 0937   BP: 142/72   BP Location: Left arm   Patient Position: Sitting   Cuff Size: Standard   Pulse: 64   Temp: (!) 96.7 °F (35.9 °C)   SpO2: 96%   Weight: 96.9 kg (213 lb 9.6 oz)   Height: 6' 1" (1.854 m)     Body mass index is 28.18 kg/m². Physical Exam  Vitals and nursing note reviewed. Constitutional:       General: He is not in acute distress. Appearance: Normal appearance. He is not ill-appearing. HENT:      Head: Normocephalic.       Right Ear: Tympanic membrane, ear canal and external ear normal. There is no impacted cerumen. Left Ear: Tympanic membrane, ear canal and external ear normal. There is no impacted cerumen. Nose: Nose normal. No congestion. Mouth/Throat:      Mouth: Mucous membranes are moist.      Pharynx: No posterior oropharyngeal erythema. Eyes:      General:         Right eye: No discharge. Left eye: No discharge. Extraocular Movements: Extraocular movements intact. Conjunctiva/sclera: Conjunctivae normal.      Pupils: Pupils are equal, round, and reactive to light. Cardiovascular:      Rate and Rhythm: Normal rate and regular rhythm. Pulses: Normal pulses. no weak pulses          Dorsalis pedis pulses are 2+ on the right side and 2+ on the left side. Posterior tibial pulses are 2+ on the right side and 2+ on the left side. Heart sounds: Normal heart sounds. No murmur heard. Pulmonary:      Effort: Pulmonary effort is normal. No respiratory distress. Breath sounds: Normal breath sounds. No wheezing. Abdominal:      General: Abdomen is flat. Bowel sounds are normal.   Musculoskeletal:         General: Normal range of motion. Cervical back: Normal range of motion. Right lower leg: No edema. Left lower leg: No edema. Feet:      Right foot:      Skin integrity: Callus present. No ulcer, skin breakdown, erythema, warmth or dry skin. Left foot:      Skin integrity: Callus present. No ulcer, skin breakdown, erythema, warmth or dry skin. Skin:     General: Skin is warm and dry. Neurological:      General: No focal deficit present. Mental Status: He is alert and oriented to person, place, and time.    Psychiatric:         Mood and Affect: Mood normal.         Behavior: Behavior normal.

## 2023-11-08 NOTE — ASSESSMENT & PLAN NOTE
Please have ultrasound of lower extremity, will call with results. Did have varicose veins removed in the past. Now with pain.

## 2023-11-08 NOTE — ASSESSMENT & PLAN NOTE
Appointment with cardiology on Friday, does have palps with strenuous exercise. Continue current meds, reviewed lab work, improved.

## 2023-11-09 NOTE — PROGRESS NOTES
[facility]   Office Consultation    Mu Chang 79 y.o. 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 Continue Lopressor 75 mg twice daily  [id number]: 3; continue Eliquis  Continue losartan and Crestor  We will continue surveillance of his valvular heart disease. Ambulatory blood pressure monitoring and maintaining a low sodium diet was advised. He was advised to notify us with the onset of cardiac symptoms. Follow up: 6 months or sooner as needed    1. Mitral valve disorder        2. PAF (paroxysmal atrial fibrillation) ([facility])            HPI: Mu Chang is a 79y.o. year old male with history of paroxysmal atrial fibrillation and mitral valve prolapse who presents for routine follow-up. He presented to [address] [place] [address] in July 2023 with new onset atrial fibrillation. TTE revealed myxomatous mitral valve with anterior mitral leaflet prolapse with mild to moderate regurgitation. He was discharged on Lopressor 75 mg twice daily and Eliquis. Elective Holter monitor revealed NSR with episodes of PAF. His A-fib burden was 6.7%. He has been doing well from a cardiac standpoint. He denies anginal symptoms or equivalents. He is predominantly maintaining sinus rhythm. He cut his Lopressor dose to 50 mg twice daily however had an increased frequency of atrial fibrillation. He has subsequently increased his dose back to 75 mg twice daily with improved control. He denies any other cardiac concerns at this time.     Family History: father with pacemaker    Social history: smokes a few cigarettes per day      No Known Allergies      Current Outpatient Medications:     apixaban (Eliquis) 5 mg, Take 1 tablet (5 mg total) by mouth 2 (two) times a day, Disp: 180 tablet, Rfl: 3    diazepam (VALIUM) 5 mg tablet, Take 1 tablet (5 mg total) by mouth every 12 (twelve) hours as needed for anxiety, Disp: 60 tablet, Rfl: 0    Empagliflozin (Jardiance) 10 MG TABS tablet, Take 1 tablet (10 mg total) by mouth every morning Consider holding at least 3 days prior to any surgery, Disp: 30 tablet, Rfl: 3    lifitegrast (XIIDRA) 5 % op solution, Administer 1 drop to both eyes 2 (two) times a day, Disp: 12 each, Rfl: 3    losartan (COZAAR) 25 mg tablet, Take 1 tablet (25 mg total) by mouth daily, Disp: 90 tablet, Rfl: 3    metoprolol tartrate (LOPRESSOR) 50 mg tablet, Take 1.5 tablets (75 mg total) by mouth every 12 (twelve) hours, Disp: 180 tablet, Rfl: 3    rosuvastatin (CRESTOR) 5 mg tablet, Take 1 tablet (5 mg total) by mouth every evening, Disp: 90 tablet, Rfl: 3    Past Medical History:   Diagnosis Date    Diabetes mellitus ([address])     Hypertension     Meniere's disease        No family history on file.     Past Surgical History:   Procedure Laterality Date    HERNIA REPAIR         Social History     Socioeconomic History    Marital status: /Civil Union     Spouse name: Not on file    Number of children: Not on file    Years of education: Not on file    Highest education level: Not on file   Occupational History    Not on file   Tobacco Use    Smoking status: Some Days     Types: Cigarettes    Smokeless tobacco: Never   Vaping Use    Vaping Use: Not on file   Substance and Sexual Activity    Alcohol use: Never    Drug use: Never    Sexual activity: Not on file   Other Topics Concern    Not on file   Social History Narrative    Not on file     Social Determinants of Health     Financial Resource Strain: Low Risk  (8/8/2023)    Overall Financial Resource Strain (CARDIA)     Difficulty of Paying Living Expenses: Not hard at all   Food Insecurity: No Food Insecurity (7/11/2023)    Hunger Vital Sign     Worried About Running Out of Food in the Last Year: Never true     [address] in the Last Year: Never true   Transportation Needs: No Transportation Needs (8/8/2023)    PRAPARE - Transportation     Lack of Transportation (Medical): No     Lack of Transportation (Non-Medical): No   Physical Activity: Not on file   Stress: Not on file   Social Connections: Not on file   Intimate Partner Violence: Not on file   Housing Stability: Unknown (7/11/2023)    Housing Stability Vital Sign     Unable to Pay for Housing in the Last Year: No     Number of Places Lived in the Last Year: Not on file     Unstable Housing in the Last Year: No       Review of Systems   Constitutional: Negative for diaphoresis, weight gain and weight loss. HENT:  Negative for congestion. Cardiovascular:  Negative for chest pain, dyspnea on exertion, irregular heartbeat, leg swelling, near-syncope, orthopnea, palpitations, paroxysmal nocturnal dyspnea and syncope. Respiratory:  Negative for shortness of breath, sleep disturbances due to breathing and snoring. Hematologic/Lymphatic: Does not bruise/bleed easily. Skin:  Negative for rash. Musculoskeletal:  Negative for myalgias. Gastrointestinal:  Negative for nausea and vomiting. Neurological:  Negative for excessive daytime sleepiness and light-headedness. Psychiatric/Behavioral:  The patient is not nervous/anxious. Vitals: /84 (BP Location: Left arm, Patient Position: Sitting, Cuff Size: Standard)   Pulse 68   Resp 16   Ht 6' 1" (1.854 m)   Wt 95.7 kg (211 lb)   SpO2 97%   BMI 27.84 kg/m²       Physical Exam:     GEN: Alert and oriented x 3, in no acute distress. Well appearing and well nourished. HEENT: Sclera anicteric, conjunctivae pink, mucous membranes moist. Oropharynx clear. NECK: Supple, no carotid bruits, no significant JVD. Trachea midline, no thyromegaly. HEART: Regular rhythm, normal S1 and S2, no murmurs, clicks, gallops or rubs. PMI nondisplaced, no thrills. LUNGS: Clear to auscultation bilaterally; no wheezes, rales, or rhonchi.  No increased work of breathing or signs of respiratory distress. ABDOMEN: Soft, nontender, nondistended, normoactive bowel sounds. EXTREMITIES: Skin warm and well perfused, no clubbing, cyanosis, or edema. NEURO: No focal findings. Normal speech. Mood and affect normal.   SKIN: Normal without suspicious lesions on exposed skin.

## 2023-11-10 ENCOUNTER — OFFICE VISIT (OUTPATIENT)
Dept: CARDIOLOGY CLINIC | Facility: CLINIC | Age: 68
End: 2023-11-10
Payer: COMMERCIAL

## 2023-11-10 VITALS
OXYGEN SATURATION: 97 % | RESPIRATION RATE: 16 BRPM | BODY MASS INDEX: 27.96 KG/M2 | DIASTOLIC BLOOD PRESSURE: 84 MMHG | HEART RATE: 68 BPM | WEIGHT: 211 LBS | HEIGHT: 73 IN | SYSTOLIC BLOOD PRESSURE: 140 MMHG

## 2023-11-10 DIAGNOSIS — I48.0 PAF (PAROXYSMAL ATRIAL FIBRILLATION) (HCC): ICD-10-CM

## 2023-11-10 DIAGNOSIS — I05.9 MITRAL VALVE DISORDER: Primary | ICD-10-CM

## 2023-11-10 PROCEDURE — 99214 OFFICE O/P EST MOD 30 MIN: CPT | Performed by: INTERNAL MEDICINE

## 2023-11-12 DIAGNOSIS — E11.65 TYPE 2 DIABETES MELLITUS WITH HYPERGLYCEMIA, WITHOUT LONG-TERM CURRENT USE OF INSULIN (HCC): ICD-10-CM

## 2023-11-12 RX ORDER — EMPAGLIFLOZIN 10 MG/1
TABLET, FILM COATED ORAL
Qty: 90 TABLET | Refills: 1 | Status: SHIPPED | OUTPATIENT
Start: 2023-11-12

## 2024-04-08 NOTE — ASSESSMENT & PLAN NOTE
Future Appointments   Date Time Provider Department Center   4/16/2024 11:00 AM Shalonda Fontaine, APRN - CNP [facility]       Last appt on 10.16.2023   Lab Results   Component Value Date    HGBA1C 9.8 (H) 04/13/2023       No results for input(s): "POCGLU" in the last 72 hours.     Blood Sugar Average: Last 72 hrs:  Hemoglobin A1c not at goal, patient states he stopped taking metformin due to side effects (was feeling weak, lightheaded)   Start SSI and frequent Accu-Cheks  Hypoglycemia protocol

## 2024-04-30 ENCOUNTER — RA CDI HCC (OUTPATIENT)
Dept: OTHER | Facility: HOSPITAL | Age: 69
End: 2024-04-30

## 2024-04-30 NOTE — PROGRESS NOTES
E11.42-11/9/23 [facility] clinical summary    E11.22  HCC coding opportunities          Chart Reviewed number of suggestions sent to Provider: 2     Patients Insurance     Medicare Insurance: [employer] Advantage

## 2024-05-06 ENCOUNTER — TELEPHONE (OUTPATIENT)
Age: 69
End: 2024-05-06

## 2024-05-06 DIAGNOSIS — E11.65 TYPE 2 DIABETES MELLITUS WITH HYPERGLYCEMIA, WITHOUT LONG-TERM CURRENT USE OF INSULIN (HCC): ICD-10-CM

## 2024-05-06 NOTE — TELEPHONE ENCOUNTER
Patients wife called and said Javed went to get his labs done this morning but there was nothing in the system.  She canceled his May 8 appointment and rescheduled for May 20 and would like labs placed for him especially to check his A1C. She would like a call back when labs are placed.

## 2024-05-07 DIAGNOSIS — Z12.5 SCREENING FOR PROSTATE CANCER: ICD-10-CM

## 2024-05-07 DIAGNOSIS — E11.65 TYPE 2 DIABETES MELLITUS WITH HYPERGLYCEMIA, WITHOUT LONG-TERM CURRENT USE OF INSULIN (HCC): ICD-10-CM

## 2024-05-07 DIAGNOSIS — I48.91 ATRIAL FIBRILLATION WITH RVR (HCC): Primary | ICD-10-CM

## 2024-05-07 DIAGNOSIS — I10 PRIMARY HYPERTENSION: ICD-10-CM

## 2024-05-07 DIAGNOSIS — E55.9 VITAMIN D DEFICIENCY: ICD-10-CM

## 2024-05-07 RX ORDER — EMPAGLIFLOZIN 10 MG/1
TABLET, FILM COATED ORAL
Qty: 30 TABLET | Refills: 0 | Status: SHIPPED | OUTPATIENT
Start: 2024-05-07

## 2024-05-10 ENCOUNTER — RA CDI HCC (OUTPATIENT)
Dept: OTHER | Facility: HOSPITAL | Age: 69
End: 2024-05-10

## 2024-05-10 NOTE — PROGRESS NOTES
E11.22, E11.42  HCC coding opportunities          Chart Reviewed number of suggestions sent to Provider: 2     Patients Insurance     Medicare Insurance: [employer] Advantage

## 2024-05-16 ENCOUNTER — APPOINTMENT (OUTPATIENT)
Dept: LAB | Facility: CLINIC | Age: 69
End: 2024-05-16
Payer: COMMERCIAL

## 2024-05-16 DIAGNOSIS — I48.91 ATRIAL FIBRILLATION WITH RVR (HCC): ICD-10-CM

## 2024-05-16 DIAGNOSIS — E11.65 TYPE 2 DIABETES MELLITUS WITH HYPERGLYCEMIA, WITHOUT LONG-TERM CURRENT USE OF INSULIN (HCC): ICD-10-CM

## 2024-05-16 DIAGNOSIS — E55.9 VITAMIN D DEFICIENCY: ICD-10-CM

## 2024-05-16 DIAGNOSIS — Z12.5 SCREENING FOR PROSTATE CANCER: ICD-10-CM

## 2024-05-16 DIAGNOSIS — I10 PRIMARY HYPERTENSION: ICD-10-CM

## 2024-05-16 LAB
25(OH)D3 SERPL-MCNC: 22.8 NG/ML (ref 30–100)
ALBUMIN SERPL BCP-MCNC: 4.6 G/DL (ref 3.5–5)
ALP SERPL-CCNC: 95 U/L (ref 34–104)
ALT SERPL W P-5'-P-CCNC: 26 U/L (ref 7–52)
ANION GAP SERPL CALCULATED.3IONS-SCNC: 10 MMOL/L (ref 4–13)
AST SERPL W P-5'-P-CCNC: 15 U/L (ref 13–39)
BACTERIA UR QL AUTO: ABNORMAL /HPF
BASOPHILS # BLD AUTO: 0.09 THOUSANDS/ÂΜL (ref 0–0.1)
BASOPHILS NFR BLD AUTO: 1 % (ref 0–1)
BILIRUB SERPL-MCNC: 0.75 MG/DL (ref 0.2–1)
BILIRUB UR QL STRIP: NEGATIVE
BUN SERPL-MCNC: 22 MG/DL (ref 5–25)
CALCIUM SERPL-MCNC: 9.7 MG/DL (ref 8.4–10.2)
CHLORIDE SERPL-SCNC: 103 MMOL/L (ref 96–108)
CHOLEST SERPL-MCNC: 159 MG/DL
CLARITY UR: CLEAR
CO2 SERPL-SCNC: 26 MMOL/L (ref 21–32)
COLOR UR: ABNORMAL
CREAT SERPL-MCNC: 1.11 MG/DL (ref 0.6–1.3)
CREAT UR-MCNC: 74.5 MG/DL
EOSINOPHIL # BLD AUTO: 0.23 THOUSAND/ÂΜL (ref 0–0.61)
EOSINOPHIL NFR BLD AUTO: 3 % (ref 0–6)
ERYTHROCYTE [DISTWIDTH] IN BLOOD BY AUTOMATED COUNT: 12.3 % (ref 11.6–15.1)
EST. AVERAGE GLUCOSE BLD GHB EST-MCNC: 183 MG/DL
GFR SERPL CREATININE-BSD FRML MDRD: 68 ML/MIN/1.73SQ M
GLUCOSE P FAST SERPL-MCNC: 138 MG/DL (ref 65–99)
GLUCOSE UR STRIP-MCNC: ABNORMAL MG/DL
HBA1C MFR BLD: 8 %
HCT VFR BLD AUTO: 57.3 % (ref 36.5–49.3)
HDLC SERPL-MCNC: 36 MG/DL
HGB BLD-MCNC: 19.2 G/DL (ref 12–17)
HGB UR QL STRIP.AUTO: NEGATIVE
IMM GRANULOCYTES # BLD AUTO: 0.05 THOUSAND/UL (ref 0–0.2)
IMM GRANULOCYTES NFR BLD AUTO: 1 % (ref 0–2)
KETONES UR STRIP-MCNC: NEGATIVE MG/DL
LDLC SERPL CALC-MCNC: 102 MG/DL (ref 0–100)
LEUKOCYTE ESTERASE UR QL STRIP: ABNORMAL
LYMPHOCYTES # BLD AUTO: 2.83 THOUSANDS/ÂΜL (ref 0.6–4.47)
LYMPHOCYTES NFR BLD AUTO: 35 % (ref 14–44)
MCH RBC QN AUTO: 32.3 PG (ref 26.8–34.3)
MCHC RBC AUTO-ENTMCNC: 33.5 G/DL (ref 31.4–37.4)
MCV RBC AUTO: 96 FL (ref 82–98)
MICROALBUMIN UR-MCNC: 12.3 MG/L
MICROALBUMIN/CREAT 24H UR: 17 MG/G CREATININE (ref 0–30)
MONOCYTES # BLD AUTO: 0.64 THOUSAND/ÂΜL (ref 0.17–1.22)
MONOCYTES NFR BLD AUTO: 8 % (ref 4–12)
NEUTROPHILS # BLD AUTO: 4.19 THOUSANDS/ÂΜL (ref 1.85–7.62)
NEUTS SEG NFR BLD AUTO: 52 % (ref 43–75)
NITRITE UR QL STRIP: NEGATIVE
NON-SQ EPI CELLS URNS QL MICRO: ABNORMAL /HPF
NONHDLC SERPL-MCNC: 123 MG/DL
NRBC BLD AUTO-RTO: 0 /100 WBCS
PH UR STRIP.AUTO: 5.5 [PH]
PLATELET # BLD AUTO: 139 THOUSANDS/UL (ref 149–390)
PMV BLD AUTO: 10.3 FL (ref 8.9–12.7)
POTASSIUM SERPL-SCNC: 4.7 MMOL/L (ref 3.5–5.3)
PROT SERPL-MCNC: 7.2 G/DL (ref 6.4–8.4)
PROT UR STRIP-MCNC: NEGATIVE MG/DL
PSA SERPL-MCNC: 1.19 NG/ML (ref 0–4)
RBC # BLD AUTO: 5.95 MILLION/UL (ref 3.88–5.62)
RBC #/AREA URNS AUTO: ABNORMAL /HPF
SODIUM SERPL-SCNC: 139 MMOL/L (ref 135–147)
SP GR UR STRIP.AUTO: 1.02 (ref 1–1.03)
TRIGL SERPL-MCNC: 103 MG/DL
TSH SERPL DL<=0.05 MIU/L-ACNC: 1.71 UIU/ML (ref 0.45–4.5)
UROBILINOGEN UR STRIP-ACNC: <2 MG/DL
WBC # BLD AUTO: 8.03 THOUSAND/UL (ref 4.31–10.16)
WBC #/AREA URNS AUTO: ABNORMAL /HPF

## 2024-05-16 PROCEDURE — 82043 UR ALBUMIN QUANTITATIVE: CPT

## 2024-05-16 PROCEDURE — 80061 LIPID PANEL: CPT

## 2024-05-16 PROCEDURE — 82570 ASSAY OF URINE CREATININE: CPT

## 2024-05-16 PROCEDURE — 85025 COMPLETE CBC W/AUTO DIFF WBC: CPT

## 2024-05-16 PROCEDURE — 80053 COMPREHEN METABOLIC PANEL: CPT

## 2024-05-16 PROCEDURE — 81001 URINALYSIS AUTO W/SCOPE: CPT

## 2024-05-16 PROCEDURE — 83036 HEMOGLOBIN GLYCOSYLATED A1C: CPT

## 2024-05-16 PROCEDURE — 36415 COLL VENOUS BLD VENIPUNCTURE: CPT

## 2024-05-16 PROCEDURE — 82306 VITAMIN D 25 HYDROXY: CPT

## 2024-05-16 PROCEDURE — G0103 PSA SCREENING: HCPCS

## 2024-05-16 PROCEDURE — 84443 ASSAY THYROID STIM HORMONE: CPT

## 2024-05-17 DIAGNOSIS — Z12.11 SCREEN FOR COLON CANCER: Primary | ICD-10-CM

## 2024-05-20 ENCOUNTER — OFFICE VISIT (OUTPATIENT)
Dept: FAMILY MEDICINE CLINIC | Facility: CLINIC | Age: 69
End: 2024-05-20
Payer: COMMERCIAL

## 2024-05-20 VITALS
BODY MASS INDEX: 27.8 KG/M2 | WEIGHT: 209.8 LBS | TEMPERATURE: 97.8 F | SYSTOLIC BLOOD PRESSURE: 158 MMHG | DIASTOLIC BLOOD PRESSURE: 86 MMHG | HEART RATE: 70 BPM | OXYGEN SATURATION: 97 % | HEIGHT: 73 IN

## 2024-05-20 DIAGNOSIS — I83.813 VARICOSE VEINS OF BOTH LOWER EXTREMITIES WITH PAIN: ICD-10-CM

## 2024-05-20 DIAGNOSIS — L85.3 DRY SKIN DERMATITIS: ICD-10-CM

## 2024-05-20 DIAGNOSIS — I48.0 PAF (PAROXYSMAL ATRIAL FIBRILLATION) (HCC): ICD-10-CM

## 2024-05-20 DIAGNOSIS — E11.65 TYPE 2 DIABETES MELLITUS WITH HYPERGLYCEMIA, WITHOUT LONG-TERM CURRENT USE OF INSULIN (HCC): ICD-10-CM

## 2024-05-20 DIAGNOSIS — D58.2 ELEVATED HEMOGLOBIN (HCC): Primary | ICD-10-CM

## 2024-05-20 DIAGNOSIS — I83.213: ICD-10-CM

## 2024-05-20 DIAGNOSIS — L97.318: ICD-10-CM

## 2024-05-20 DIAGNOSIS — J44.9 CHRONIC OBSTRUCTIVE PULMONARY DISEASE, UNSPECIFIED COPD TYPE (HCC): ICD-10-CM

## 2024-05-20 PROCEDURE — 99214 OFFICE O/P EST MOD 30 MIN: CPT

## 2024-05-20 RX ORDER — AMMONIUM LACTATE 12 G/100G
LOTION TOPICAL 2 TIMES DAILY PRN
Qty: 396 G | Refills: 1 | Status: SHIPPED | OUTPATIENT
Start: 2024-05-20

## 2024-05-20 NOTE — ASSESSMENT & PLAN NOTE
A1c still elevated, did review all lab work.  Will increase Jardiance to 25 mg follow-up in 3 months or sooner if needed  Lab Results   Component Value Date    HGBA1C 8.0 (H) 05/16/2024

## 2024-05-20 NOTE — ASSESSMENT & PLAN NOTE
Patient with elevated hemoglobin on last 2 lab results, will refer to hematology, he is not taking any testosterone supplements.  He does have allover body aches and pains which he is stopping his statin to do a trial to see if an improvement.  Will follow-up after seeing hematology

## 2024-05-20 NOTE — ASSESSMENT & PLAN NOTE
History of having varicose veins removed, strong family history of peripheral vascular disease.  Please have ordered's ultrasound.  Follow-up with vascular.  Do recommend low-salt diet and wearing compression stockings and elevating feet as often as possible when seated.  At this time patient does not think any of those recommendations are necessary.

## 2024-05-20 NOTE — ASSESSMENT & PLAN NOTE
Continue metoprolol, continue Eliquis twice daily.  Continue to follow with cardiology.  Heart rate well-controlled today.  Asymptomatic at this time.  Will continue to monitor

## 2024-05-20 NOTE — ASSESSMENT & PLAN NOTE
Last chest CAT scan was from July, will order after next visit.  Asymptomatic at this time.  Will continue to monitor

## 2024-05-20 NOTE — PROGRESS NOTES
Assessment/Plan:         Problem List Items Addressed This Visit        Cardiovascular and Mediastinum    PAF (paroxysmal atrial fibrillation) (HCC)     Continue metoprolol, continue Eliquis twice daily.  Continue to follow with cardiology.  Heart rate well-controlled today.  Asymptomatic at this time.  Will continue to monitor         Varicose veins of lower extremity     History of having varicose veins removed, strong family history of peripheral vascular disease.  Please have ordered's ultrasound.  Follow-up with vascular.  Do recommend low-salt diet and wearing compression stockings and elevating feet as often as possible when seated.  At this time patient does not think any of those recommendations are necessary.         Relevant Orders    Ambulatory Referral to Vascular Surgery    Varicose veins of right lower extremity with inflammation, with ulcer of ankle of other severity (HCC)     History of having varicose veins removed, strong family history of peripheral vascular disease.  Please have ordered's ultrasound.  Follow-up with vascular.  Do recommend low-salt diet and wearing compression stockings and elevating feet as often as possible when seated.  At this time patient does not think any of those recommendations are necessary.         Relevant Medications    ammonium lactate (LAC-HYDRIN) 12 % lotion       Respiratory    Chronic obstructive pulmonary disease, unspecified COPD type (HCC)     Last chest CAT scan was from July, will order after next visit.  Asymptomatic at this time.  Will continue to monitor            Endocrine    Diabetes mellitus (HCC)     A1c still elevated, did review all lab work.  Will increase Jardiance to 25 mg follow-up in 3 months or sooner if needed  Lab Results   Component Value Date    HGBA1C 8.0 (H) 05/16/2024          Relevant Medications    Empagliflozin 25 MG TABS       Blood    Elevated hemoglobin (HCC) - Primary     Patient with elevated hemoglobin on last 2 lab results,  will refer to hematology, he is not taking any testosterone supplements.  He does have allover body aches and pains which he is stopping his statin to do a trial to see if an improvement.  Will follow-up after seeing hematology         Relevant Orders    Ambulatory Referral to Hematology / Oncology   Other Visit Diagnoses     Dry skin dermatitis        Will treat with ammonium lactate, follow up as needed. Recommend compression stockings.    Relevant Medications    ammonium lactate (LAC-HYDRIN) 12 % lotion            Subjective:      Patient ID: Javed Gayle is a 68 y.o. male.    Javed is here for follow up, he is having some body aches and pains and is  contributing it to the statin. He is having some leg swelling and concerned with lymphedema.         The following portions of the patient's history were reviewed and updated as appropriate:   Past Medical History:  He has a past medical history of Diabetes mellitus (HCC), Hypertension, and Meniere's disease.,  _______________________________________________________________________  Medical Problems:  does not have any pertinent problems on file.,  _______________________________________________________________________  Past Surgical History:   has a past surgical history that includes Hernia repair.,  _______________________________________________________________________  Family History:  family history is not on file.,  _______________________________________________________________________  Social History:   reports that he has been smoking cigarettes. He has never used smokeless tobacco. He reports that he does not drink alcohol and does not use drugs.,  _______________________________________________________________________  Allergies:  has No Known Allergies..  _______________________________________________________________________  Current Outpatient Medications   Medication Sig Dispense Refill   • ammonium lactate (LAC-HYDRIN) 12 % lotion Apply  "topically 2 (two) times a day as needed for dry skin 396 g 1   • apixaban (Eliquis) 5 mg Take 1 tablet (5 mg total) by mouth 2 (two) times a day 180 tablet 3   • diazepam (VALIUM) 5 mg tablet Take 1 tablet (5 mg total) by mouth every 12 (twelve) hours as needed for anxiety 60 tablet 0   • Empagliflozin 25 MG TABS Take 1 tablet (25 mg total) by mouth every morning 90 tablet 3   • lifitegrast (XIIDRA) 5 % op solution Administer 1 drop to both eyes 2 (two) times a day 12 each 3   • losartan (COZAAR) 25 mg tablet Take 1 tablet (25 mg total) by mouth daily 90 tablet 3   • metoprolol tartrate (LOPRESSOR) 50 mg tablet Take 1.5 tablets (75 mg total) by mouth every 12 (twelve) hours 180 tablet 3   • rosuvastatin (CRESTOR) 5 mg tablet Take 1 tablet (5 mg total) by mouth every evening 90 tablet 3     No current facility-administered medications for this visit.     _______________________________________________________________________  Review of Systems   Respiratory:  Negative for cough, chest tightness and shortness of breath.    Cardiovascular:  Positive for chest pain and leg swelling. Negative for palpitations.   Musculoskeletal:  Positive for arthralgias. Negative for back pain.   All other systems reviewed and are negative.        Objective:  Vitals:    05/20/24 1236   BP: 158/86   BP Location: Left arm   Patient Position: Sitting   Pulse: 70   Temp: 97.8 °F (36.6 °C)   SpO2: 97%   Weight: 95.2 kg (209 lb 12.8 oz)   Height: 6' 1\" (1.854 m)     Body mass index is 27.68 kg/m².     Physical Exam  Vitals and nursing note reviewed.   Constitutional:       Appearance: Normal appearance. He is not ill-appearing.   HENT:      Head: Normocephalic.      Right Ear: Tympanic membrane, ear canal and external ear normal. There is no impacted cerumen.      Left Ear: Tympanic membrane, ear canal and external ear normal. There is no impacted cerumen.      Nose: Nose normal. No congestion.      Mouth/Throat:      Mouth: Mucous membranes " are moist.   Eyes:      Extraocular Movements: Extraocular movements intact.      Conjunctiva/sclera: Conjunctivae normal.      Pupils: Pupils are equal, round, and reactive to light.   Cardiovascular:      Rate and Rhythm: Normal rate and regular rhythm.      Heart sounds: Normal heart sounds. No murmur heard.  Pulmonary:      Effort: Pulmonary effort is normal.      Breath sounds: Normal breath sounds. No wheezing.   Abdominal:      Palpations: Abdomen is soft.      Tenderness: There is no abdominal tenderness.   Musculoskeletal:      Cervical back: Normal range of motion. No tenderness.      Right lower leg: No edema.      Left lower leg: No edema.   Skin:     General: Skin is warm and dry.   Neurological:      General: No focal deficit present.      Mental Status: He is alert.   Psychiatric:         Mood and Affect: Mood normal.         Behavior: Behavior normal.

## 2024-05-22 ENCOUNTER — VBI (OUTPATIENT)
Dept: ADMINISTRATIVE | Facility: OTHER | Age: 69
End: 2024-05-22

## 2024-05-22 ENCOUNTER — TELEPHONE (OUTPATIENT)
Dept: HEMATOLOGY ONCOLOGY | Facility: CLINIC | Age: 69
End: 2024-05-22

## 2024-05-22 NOTE — TELEPHONE ENCOUNTER
I called Javed in response to a referral that was received for patient to establish care with Hematology.     Outreach was made to schedule a consultation.    [facility] answered the phone and informed me that they have a busy schedule right now and will call back at a later time.   The referral has been closed.

## 2024-08-08 ENCOUNTER — RA CDI HCC (OUTPATIENT)
Dept: OTHER | Facility: HOSPITAL | Age: 69
End: 2024-08-08

## 2024-09-13 ENCOUNTER — VBI (OUTPATIENT)
Dept: ADMINISTRATIVE | Facility: OTHER | Age: 69
End: 2024-09-13

## 2024-09-13 NOTE — TELEPHONE ENCOUNTER
09/13/24 2:44 PM     Chart reviewed for CRC: Colonoscopy and Diabetic Eye Exam ; nothing is submitted to the patient's insurance at this time.     Magui Cuenca   [facility]

## 2024-09-21 DIAGNOSIS — I48.0 PAF (PAROXYSMAL ATRIAL FIBRILLATION) (HCC): ICD-10-CM

## 2024-09-22 DIAGNOSIS — I10 PRIMARY HYPERTENSION: ICD-10-CM

## 2024-09-23 RX ORDER — METOPROLOL TARTRATE 50 MG
50 TABLET ORAL EVERY 12 HOURS
Qty: 60 TABLET | Refills: 0 | Status: SHIPPED | OUTPATIENT
Start: 2024-09-23

## 2024-09-23 RX ORDER — LOSARTAN POTASSIUM 25 MG/1
25 TABLET ORAL DAILY
Qty: 90 TABLET | Refills: 0 | Status: SHIPPED | OUTPATIENT
Start: 2024-09-23

## 2024-10-04 ENCOUNTER — TELEPHONE (OUTPATIENT)
Age: 69
End: 2024-10-04

## 2024-10-04 DIAGNOSIS — E11.65 TYPE 2 DIABETES MELLITUS WITH HYPERGLYCEMIA, WITHOUT LONG-TERM CURRENT USE OF INSULIN (HCC): Primary | ICD-10-CM

## 2024-10-04 NOTE — TELEPHONE ENCOUNTER
Pt's wife, Casi, called. She asked that I pass this message along to Macarena Francisco.  Her and Javed are very frustrated because this is the third time he went for blood work and it wasn't in his chart.  They feel they should not have to call first to see if it's in his chart and that he should have routine labs placed after every appt.  She said if this continues to happen they will leave the practice.

## 2024-10-17 ENCOUNTER — RA CDI HCC (OUTPATIENT)
Dept: OTHER | Facility: HOSPITAL | Age: 69
End: 2024-10-17

## 2024-10-17 NOTE — PROGRESS NOTES
E11.42, e11.22  HCC coding opportunities          Chart Reviewed number of suggestions sent to Provider: 2     Patients Insurance     Medicare Insurance: [employer]vantage

## 2024-10-18 ENCOUNTER — TELEPHONE (OUTPATIENT)
Age: 69
End: 2024-10-18

## 2024-10-18 DIAGNOSIS — E11.65 TYPE 2 DIABETES MELLITUS WITH HYPERGLYCEMIA, WITHOUT LONG-TERM CURRENT USE OF INSULIN (HCC): ICD-10-CM

## 2024-10-18 DIAGNOSIS — I48.0 PAF (PAROXYSMAL ATRIAL FIBRILLATION) (HCC): ICD-10-CM

## 2024-10-18 RX ORDER — METOPROLOL TARTRATE 50 MG
50 TABLET ORAL EVERY 12 HOURS
Qty: 8 TABLET | Refills: 0 | Status: SHIPPED | OUTPATIENT
Start: 2024-10-18

## 2024-10-18 NOTE — TELEPHONE ENCOUNTER
Patient is out of town longer then he though and now out of medications and needs them sent: [facility] [address], [place] [address] Phone [phone]    apixaban (Eliquis) 5 mg  Jardiance  metoprolol tartrate (LOPRESSOR) 50 mg tablet     He needs enough for 4 days of each med listed above. Please call wife Casi at [phone] so they can  the pills he was afib for a bit but doing better now.

## 2024-10-23 ENCOUNTER — APPOINTMENT (OUTPATIENT)
Dept: LAB | Facility: CLINIC | Age: 69
End: 2024-10-23
Payer: COMMERCIAL

## 2024-10-23 DIAGNOSIS — E11.65 TYPE 2 DIABETES MELLITUS WITH HYPERGLYCEMIA, WITHOUT LONG-TERM CURRENT USE OF INSULIN (HCC): ICD-10-CM

## 2024-10-23 LAB
ALBUMIN SERPL BCG-MCNC: 4.2 G/DL (ref 3.5–5)
ALP SERPL-CCNC: 110 U/L (ref 34–104)
ALT SERPL W P-5'-P-CCNC: 17 U/L (ref 7–52)
ANION GAP SERPL CALCULATED.3IONS-SCNC: 10 MMOL/L (ref 4–13)
AST SERPL W P-5'-P-CCNC: 16 U/L (ref 13–39)
BASOPHILS # BLD AUTO: 0.07 THOUSANDS/ΜL (ref 0–0.1)
BASOPHILS NFR BLD AUTO: 1 % (ref 0–1)
BILIRUB SERPL-MCNC: 0.54 MG/DL (ref 0.2–1)
BUN SERPL-MCNC: 24 MG/DL (ref 5–25)
CALCIUM SERPL-MCNC: 8.9 MG/DL (ref 8.4–10.2)
CHLORIDE SERPL-SCNC: 104 MMOL/L (ref 96–108)
CHOLEST SERPL-MCNC: 164 MG/DL
CO2 SERPL-SCNC: 25 MMOL/L (ref 21–32)
CREAT SERPL-MCNC: 1.08 MG/DL (ref 0.6–1.3)
CREAT UR-MCNC: 69.8 MG/DL
EOSINOPHIL # BLD AUTO: 0.21 THOUSAND/ΜL (ref 0–0.61)
EOSINOPHIL NFR BLD AUTO: 3 % (ref 0–6)
ERYTHROCYTE [DISTWIDTH] IN BLOOD BY AUTOMATED COUNT: 11.8 % (ref 11.6–15.1)
EST. AVERAGE GLUCOSE BLD GHB EST-MCNC: 171 MG/DL
GFR SERPL CREATININE-BSD FRML MDRD: 70 ML/MIN/1.73SQ M
GLUCOSE P FAST SERPL-MCNC: 144 MG/DL (ref 65–99)
HBA1C MFR BLD: 7.6 %
HCT VFR BLD AUTO: 54.4 % (ref 36.5–49.3)
HDLC SERPL-MCNC: 30 MG/DL
HGB BLD-MCNC: 18.3 G/DL (ref 12–17)
IMM GRANULOCYTES # BLD AUTO: 0.03 THOUSAND/UL (ref 0–0.2)
IMM GRANULOCYTES NFR BLD AUTO: 0 % (ref 0–2)
LDLC SERPL CALC-MCNC: 111 MG/DL (ref 0–100)
LYMPHOCYTES # BLD AUTO: 2.13 THOUSANDS/ΜL (ref 0.6–4.47)
LYMPHOCYTES NFR BLD AUTO: 30 % (ref 14–44)
MCH RBC QN AUTO: 32.3 PG (ref 26.8–34.3)
MCHC RBC AUTO-ENTMCNC: 33.6 G/DL (ref 31.4–37.4)
MCV RBC AUTO: 96 FL (ref 82–98)
MICROALBUMIN UR-MCNC: 9 MG/L
MICROALBUMIN/CREAT 24H UR: 13 MG/G CREATININE (ref 0–30)
MONOCYTES # BLD AUTO: 0.74 THOUSAND/ΜL (ref 0.17–1.22)
MONOCYTES NFR BLD AUTO: 11 % (ref 4–12)
NEUTROPHILS # BLD AUTO: 3.86 THOUSANDS/ΜL (ref 1.85–7.62)
NEUTS SEG NFR BLD AUTO: 55 % (ref 43–75)
NRBC BLD AUTO-RTO: 0 /100 WBCS
PLATELET # BLD AUTO: 132 THOUSANDS/UL (ref 149–390)
PMV BLD AUTO: 10.1 FL (ref 8.9–12.7)
POTASSIUM SERPL-SCNC: 4.1 MMOL/L (ref 3.5–5.3)
PROT SERPL-MCNC: 6.8 G/DL (ref 6.4–8.4)
RBC # BLD AUTO: 5.67 MILLION/UL (ref 3.88–5.62)
SODIUM SERPL-SCNC: 139 MMOL/L (ref 135–147)
TRIGL SERPL-MCNC: 116 MG/DL
WBC # BLD AUTO: 7.04 THOUSAND/UL (ref 4.31–10.16)

## 2024-10-23 PROCEDURE — 82570 ASSAY OF URINE CREATININE: CPT

## 2024-10-23 PROCEDURE — 80061 LIPID PANEL: CPT

## 2024-10-23 PROCEDURE — 82043 UR ALBUMIN QUANTITATIVE: CPT

## 2024-10-23 PROCEDURE — 85025 COMPLETE CBC W/AUTO DIFF WBC: CPT

## 2024-10-23 PROCEDURE — 83036 HEMOGLOBIN GLYCOSYLATED A1C: CPT

## 2024-10-23 PROCEDURE — 36415 COLL VENOUS BLD VENIPUNCTURE: CPT

## 2024-10-23 PROCEDURE — 80053 COMPREHEN METABOLIC PANEL: CPT

## 2024-10-24 ENCOUNTER — OFFICE VISIT (OUTPATIENT)
Dept: FAMILY MEDICINE CLINIC | Facility: CLINIC | Age: 69
End: 2024-10-24
Payer: COMMERCIAL

## 2024-10-24 VITALS
BODY MASS INDEX: 28.44 KG/M2 | HEIGHT: 73 IN | SYSTOLIC BLOOD PRESSURE: 130 MMHG | OXYGEN SATURATION: 93 % | WEIGHT: 214.6 LBS | HEART RATE: 56 BPM | DIASTOLIC BLOOD PRESSURE: 50 MMHG

## 2024-10-24 DIAGNOSIS — D69.6 PLATELETS DECREASED (HCC): ICD-10-CM

## 2024-10-24 DIAGNOSIS — I48.0 PAF (PAROXYSMAL ATRIAL FIBRILLATION) (HCC): ICD-10-CM

## 2024-10-24 DIAGNOSIS — I48.91 ATRIAL FIBRILLATION WITH RVR (HCC): ICD-10-CM

## 2024-10-24 DIAGNOSIS — L97.318: ICD-10-CM

## 2024-10-24 DIAGNOSIS — E11.65 TYPE 2 DIABETES MELLITUS WITH HYPERGLYCEMIA, WITHOUT LONG-TERM CURRENT USE OF INSULIN (HCC): ICD-10-CM

## 2024-10-24 DIAGNOSIS — Z00.00 MEDICARE ANNUAL WELLNESS VISIT, SUBSEQUENT: Primary | ICD-10-CM

## 2024-10-24 DIAGNOSIS — D58.2 ELEVATED HEMOGLOBIN (HCC): ICD-10-CM

## 2024-10-24 DIAGNOSIS — I10 PRIMARY HYPERTENSION: ICD-10-CM

## 2024-10-24 DIAGNOSIS — Z12.11 SCREEN FOR COLON CANCER: ICD-10-CM

## 2024-10-24 DIAGNOSIS — I83.213: ICD-10-CM

## 2024-10-24 PROBLEM — N18.31 STAGE 3A CHRONIC KIDNEY DISEASE (HCC): Status: RESOLVED | Noted: 2023-08-08 | Resolved: 2024-10-24

## 2024-10-24 PROCEDURE — G0439 PPPS, SUBSEQ VISIT: HCPCS

## 2024-10-24 PROCEDURE — 99214 OFFICE O/P EST MOD 30 MIN: CPT

## 2024-10-24 NOTE — PATIENT INSTRUCTIONS
Medicare Preventive Visit Patient Instructions  Thank you for completing your Welcome to Medicare Visit or Medicare Annual Wellness Visit today. Your next wellness visit will be due in one year (10/25/2025).  The screening/preventive services that you may require over the next 5-10 years are detailed below. Some tests may not apply to you based off risk factors and/or age. Screening tests ordered at today's visit but not completed yet may show as past due. Also, please note that scanned in results may not display below.  Preventive Screenings:  Service Recommendations Previous Testing/Comments   Colorectal Cancer Screening  Colonoscopy    Fecal Occult Blood Test (FOBT)/Fecal Immunochemical Test (FIT)  Fecal DNA/Cologuard Test  Flexible Sigmoidoscopy Age: 45-75 years old   Colonoscopy: every 10 years (May be performed more frequently if at higher risk)  OR  FOBT/FIT: every 1 year  OR  Cologuard: every 3 years  OR  Sigmoidoscopy: every 5 years  Screening may be recommended earlier than age 45 if at higher risk for colorectal cancer. Also, an individualized decision between you and your healthcare provider will decide whether screening between the ages of 76-85 would be appropriate. Colonoscopy: Not on file  FOBT/FIT: Not on file  Cologuard: Not on file  Sigmoidoscopy: Not on file          Prostate Cancer Screening Individualized decision between patient and health care provider in men between ages of 55-69   Medicare will cover every 12 months beginning on the day after your 50th birthday PSA: 1.19 ng/mL     Screening Current     Hepatitis C Screening Once for adults born between 1945 and 1965  More frequently in patients at high risk for Hepatitis C Hep C Antibody: Not on file        Diabetes Screening 1-2 times per year if you're at risk for diabetes or have pre-diabetes Fasting glucose: 144 mg/dL (10/23/2024)  A1C: 7.6 % (10/23/2024)  Screening Not Indicated  History Diabetes   Cholesterol Screening Once every 5  years if you don't have a lipid disorder. May order more often based on risk factors. Lipid panel: 10/23/2024  Screening Current      Other Preventive Screenings Covered by Medicare:  Abdominal Aortic Aneurysm (AAA) Screening: covered once if your at risk. You're considered to be at risk if you have a family history of AAA or a male between the age of 65-75 who smoking at least 100 cigarettes in your lifetime.  Lung Cancer Screening: covers low dose CT scan once per year if you meet all of the following conditions: (1) Age 55-77; (2) No signs or symptoms of lung cancer; (3) Current smoker or have quit smoking within the last 15 years; (4) You have a tobacco smoking history of at least 20 pack years (packs per day x number of years you smoked); (5) You get a written order from a healthcare provider.  Glaucoma Screening: covered annually if you're considered high risk: (1) You have diabetes OR (2) Family history of glaucoma OR (3)  aged 50 and older OR (4)  American aged 65 and older  Osteoporosis Screening: covered every 2 years if you meet one of the following conditions: (1) Have a vertebral abnormality; (2) On glucocorticoid therapy for more than 3 months; (3) Have primary hyperparathyroidism; (4) On osteoporosis medications and need to assess response to drug therapy.  HIV Screening: covered annually if you're between the age of 15-65. Also covered annually if you are younger than 15 and older than 65 with risk factors for HIV infection. For pregnant patients, it is covered up to 3 times per pregnancy.    Immunizations:  Immunization Recommendations   Influenza Vaccine Annual influenza vaccination during flu season is recommended for all persons aged >= 6 months who do not have contraindications   Pneumococcal Vaccine   * Pneumococcal conjugate vaccine = PCV13 (Prevnar 13), PCV15 (Vaxneuvance), PCV20 (Prevnar 20)  * Pneumococcal polysaccharide vaccine = PPSV23 (Pneumovax) Adults 19-65 yo  with certain risk factors or if 65+ yo  If never received any pneumonia vaccine: recommend Prevnar 20 (PCV20)  Give PCV20 if previously received 1 dose of PCV13 or PPSV23   Hepatitis B Vaccine 3 dose series if at intermediate or high risk (ex: diabetes, end stage renal disease, liver disease)   Respiratory syncytial virus (RSV) Vaccine - COVERED BY MEDICARE PART D  * RSVPreF3 (Arexvy) CDC recommends that adults 60 years of age and older may receive a single dose of RSV vaccine using shared clinical decision-making (SCDM)   Tetanus (Td) Vaccine - COST NOT COVERED BY MEDICARE PART B Following completion of primary series, a booster dose should be given every 10 years to maintain immunity against tetanus. Td may also be given as tetanus wound prophylaxis.   Tdap Vaccine - COST NOT COVERED BY MEDICARE PART B Recommended at least once for all adults. For pregnant patients, recommended with each pregnancy.   Shingles Vaccine (Shingrix) - COST NOT COVERED BY MEDICARE PART B  2 shot series recommended in those 19 years and older who have or will have weakened immune systems or those 50 years and older     Health Maintenance Due:      Topic Date Due   • Hepatitis C Screening  Never done   • Colorectal Cancer Screening  Never done     Immunizations Due:      Topic Date Due   • Pneumococcal Vaccine: 65+ Years (1 of 2 - PCV) Never done   • Influenza Vaccine (1) Never done   • COVID-19 Vaccine (3 - 2023-24 season) 09/01/2024     Advance Directives   What are advance directives?  Advance directives are legal documents that state your wishes and plans for medical care. These plans are made ahead of time in case you lose your ability to make decisions for yourself. Advance directives can apply to any medical decision, such as the treatments you want, and if you want to donate organs.   What are the types of advance directives?  There are many types of advance directives, and each state has rules about how to use them. You may  choose a combination of any of the following:  Living will:  This is a written record of the treatment you want. You can also choose which treatments you do not want, which to limit, and which to stop at a certain time. This includes surgery, medicine, IV fluid, and tube feedings.   Durable power of  for healthcare (DPAHC):  This is a written record that states who you want to make healthcare choices for you when you are unable to make them for yourself. This person, called a proxy, is usually a family member or a friend. You may choose more than 1 proxy.  Do not resuscitate (DNR) order:  A DNR order is used in case your heart stops beating or you stop breathing. It is a request not to have certain forms of treatment, such as CPR. A DNR order may be included in other types of advance directives.  Medical directive:  This covers the care that you want if you are in a coma, near death, or unable to make decisions for yourself. You can list the treatments you want for each condition. Treatment may include pain medicine, surgery, blood transfusions, dialysis, IV or tube feedings, and a ventilator (breathing machine).  Values history:  This document has questions about your views, beliefs, and how you feel and think about life. This information can help others choose the care that you would choose.  Why are advance directives important?  An advance directive helps you control your care. Although spoken wishes may be used, it is better to have your wishes written down. Spoken wishes can be misunderstood, or not followed. Treatments may be given even if you do not want them. An advance directive may make it easier for your family to make difficult choices about your care.   Cigarette Smoking and Your Health   Risks to your health if you smoke:  Nicotine and other chemicals found in tobacco damage every cell in your body. Even if you are a light smoker, you have an increased risk for cancer, heart disease, and lung  disease. If you are pregnant or have diabetes, smoking increases your risk for complications.   Benefits to your health if you stop smoking:   You decrease respiratory symptoms such as coughing, wheezing, and shortness of breath.   You reduce your risk for cancers of the lung, mouth, throat, kidney, bladder, pancreas, stomach, and cervix. If you already have cancer, you increase the benefits of chemotherapy. You also reduce your risk for cancer returning or a second cancer from developing.   You reduce your risk for heart disease, blood clots, heart attack, and stroke.   You reduce your risk for lung infections, and diseases such as pneumonia, asthma, chronic bronchitis, and emphysema.  Your circulation improves. More oxygen can be delivered to your body. If you have diabetes, you lower your risk for complications, such as kidney, artery, and eye diseases. You also lower your risk for nerve damage. Nerve damage can lead to amputations, poor vision, and blindness.  You improve your body's ability to heal and to fight infections.  For more information and support to stop smoking:   [employer]  Phone: 0- 689 - 485-1984  Web Address: www.[employer]  Weight Management   Why it is important to manage your weight:  Being overweight increases your risk of health conditions such as heart disease, high blood pressure, type 2 diabetes, and certain types of cancer. It can also increase your risk for osteoarthritis, sleep apnea, and other respiratory problems. Aim for a slow, steady weight loss. Even a small amount of weight loss can lower your risk of health problems.  How to lose weight safely:  A safe and healthy way to lose weight is to eat fewer calories and get regular exercise. You can lose up about 1 pound a week by decreasing the number of calories you eat by 500 calories each day.   Healthy meal plan for weight management:  A healthy meal plan includes a variety of foods, contains fewer calories, and helps you  stay healthy. A healthy meal plan includes the following:  Eat whole-grain foods more often.  A healthy meal plan should contain fiber. Fiber is the part of grains, fruits, and vegetables that is not broken down by your body. Whole-grain foods are healthy and provide extra fiber in your diet. Some examples of whole-grain foods are whole-wheat breads and pastas, oatmeal, brown rice, and bulgur.  Eat a variety of vegetables every day.  Include dark, leafy greens such as spinach, kale, demetrice greens, and mustard greens. Eat yellow and orange vegetables such as carrots, sweet potatoes, and winter squash.   Eat a variety of fruits every day.  Choose fresh or canned fruit (canned in its own juice or light syrup) instead of juice. Fruit juice has very little or no fiber.  Eat low-fat dairy foods.  Drink fat-free (skim) milk or 1% milk. Eat fat-free yogurt and low-fat cottage cheese. Try low-fat cheeses such as mozzarella and other reduced-fat cheeses.  Choose meat and other protein foods that are low in fat.  Choose beans or other legumes such as split peas or lentils. Choose fish, skinless poultry (chicken or turkey), or lean cuts of red meat (beef or pork). Before you cook meat or poultry, cut off any visible fat.   Use less fat and oil.  Try baking foods instead of frying them. Add less fat, such as margarine, sour cream, regular salad dressing and mayonnaise to foods. Eat fewer high-fat foods. Some examples of high-fat foods include french fries, doughnuts, ice cream, and cakes.  Eat fewer sweets.  Limit foods and drinks that are high in sugar. This includes candy, cookies, regular soda, and sweetened drinks.  Exercise:  Exercise at least 30 minutes per day on most days of the week. Some examples of exercise include walking, biking, dancing, and swimming. You can also fit in more physical activity by taking the stairs instead of the elevator or parking farther away from stores. Ask your healthcare provider about the  best exercise plan for you.      © Copyright [employer] 2018 Information is for End User's use only and may not be sold, redistributed or otherwise used for commercial purposes. All illustrations and images included in CareNotes® are the copyrighted property of [employer] Inc[employer] or [employer]

## 2024-10-24 NOTE — PROGRESS NOTES
Ambulatory Visit  Name: Javed Gayle      : 1955      MRN: [id number]  Encounter Provider: GRACIA Gomez  Encounter Date: 10/24/2024   Encounter department: [facility] [address]    Assessment & Plan  Medicare annual wellness visit, subsequent         Screen for colon cancer    Orders:    Ambulatory Referral to Gastroenterology; Future    Atrial fibrillation with RVR (HCC)  Continue current treatment plan, reviewed labs, needs to establish with new cardiologist. Stable, will continue to monitor.          Primary hypertension  Continue current treatment plan, reviewed labs, needs to establish with new cardiologist. Stable, will continue to monitor.          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 Stable, will continue to monitor.     Orders:    Ambulatory Referral to Cardiology; Future    Type 2 diabetes mellitus with hyperglycemia, without long-term current use of insulin (HCC)  Continue current treatment plan, reviewed labs, continue to monitor  Lab Results   Component Value Date    HGBA1C 7.6 (H) 10/23/2024            Platelets decreased (HCC)  Reviewed lab work, will continue to monitor, on eliquis          Elevated hemoglobin (HCC)  Increase hydration, stable, will continue to monitor.            Depression Screening and Follow-up Plan: Patient was screened for depression during today's encounter. They screened negative with a PHQ-2 score of 0.    Tobacco Cessation Counseling: Tobacco cessation counseling was provided. The patient is sincerely urged to quit consumption of tobacco. He is not ready to quit tobacco. Medication options and side effects of medication not discussed. Patient  agreed to medication.       Preventive health issues were discussed with patient, and age appropriate screening tests were ordered as noted in patient's After Visit Summary. Personalized health advice and appropriate referrals for health education or preventive services given if needed, as noted in patient's After Visit Summary.    History of Present Illness     Jericho is here for follow up, he is back from [place]. His father did pass away.        Patient Care Team:  GRACIA Gomez as PCP - General (Nurse Practitioner)    Review of Systems   Constitutional:  Negative for chills, diaphoresis and fever.   HENT:  Negative for congestion, ear pain, postnasal drip, rhinorrhea, sinus pain and sore throat.    Respiratory:  Positive for shortness of breath. Negative for cough, chest tightness and wheezing.    Cardiovascular:  Positive for palpitations. Negative for chest pain.   Gastrointestinal:  Negative for abdominal pain, constipation, diarrhea, nausea and vomiting.   Genitourinary:  Negative for dysuria, frequency, hematuria and urgency.   Musculoskeletal:  Positive for arthralgias and myalgias.   Neurological:  Positive for dizziness. Negative for headaches.   Psychiatric/Behavioral:  Negative for dysphoric mood and sleep disturbance. The patient is not nervous/anxious.    All other systems reviewed and are negative.    Medical History Reviewed by provider this encounter:  Tobacco  Allergies  Meds  Problems  Med Hx  Surg Hx  Fam Hx       Annual Wellness Visit Questionnaire   Javed is here for his Subsequent Wellness visit. Last Medicare Wellness visit information reviewed, patient interviewed and updates made to the record today.      Health Risk Assessment:   Patient rates overall health as good. Patient feels that their physical health rating is same. Patient is very satisfied with their life. Eyesight was rated as same. Hearing was rated as same. Patient feels that their emotional and mental health  rating is same. Patients states they are never, rarely angry. Patient states they are sometimes unusually tired/fatigued. Pain experienced in the last 7 days has been some. Patient's pain rating has been 3/10. Patient states that he has experienced no weight loss or gain in last 6 months.     Depression Screening:   PHQ-2 Score: 0      Fall Risk Screening:   In the past year, patient has experienced: no history of falling in past year      Home Safety:  Patient does not have trouble with stairs inside or outside of their home. Patient has working smoke alarms and has working carbon monoxide detector. Home safety hazards include: none.     Nutrition:   Current diet is Regular.     Medications:   Patient is not currently taking any over-the-counter supplements. Patient is able to manage medications.     Activities of Daily Living (ADLs)/Instrumental Activities of Daily Living (IADLs):   Walk and transfer into and out of bed and chair?: Yes  Dress and groom yourself?: Yes    Bathe or shower yourself?: Yes    Feed yourself? Yes  Do your laundry/housekeeping?: Yes  Manage your money, pay your bills and track your expenses?: Yes  Make your own meals?: Yes    Do your own shopping?: Yes    Durable Medical Equipment Suppliers  None    Previous Hospitalizations:   Any hospitalizations or ED visits within the last 12 months?: No      Advance Care Planning:   Living will: No    Durable POA for healthcare: No    Advanced directive: No    Advanced directive counseling given: Yes    ACP document given: Yes    Patient declined ACP directive: No    End of Life Decisions reviewed with patient: Yes    Provider agrees with end of life decisions: Yes      Comments: If hope for survival would want attempts made    Cognitive Screening:   Provider or family/friend/caregiver concerned regarding cognition?: No    PREVENTIVE SCREENINGS      Cardiovascular Screening:    General: Screening Current      Diabetes Screening:     General: Screening  Not Indicated, History Diabetes and Screening Current      Colorectal Cancer Screening:     General: Risks and Benefits Discussed      Prostate Cancer Screening:    General: Screening Current      Osteoporosis Screening:    General: Screening Not Indicated      Abdominal Aortic Aneurysm (AAA) Screening:    Risk factors include: age between 65-74 yo and tobacco use        Lung Cancer Screening:     General: Screening Not Indicated      Hepatitis C Screening:    General: Screening Current    Screening, Brief Intervention, and Referral to Treatment (SBIRT)    Screening  Typical number of drinks in a day: 0  Typical number of drinks in a week: 0  Interpretation: Low risk drinking behavior.    AUDIT-C Screenin) How often did you have a drink containing alcohol in the past year? never  2) How many drinks did you have on a typical day when you were drinking in the past year? 0  3) How often did you have 6 or more drinks on one occasion in the past year? never    AUDIT-C Score: 0  Interpretation: Score 0-3 (male): Negative screen for alcohol misuse    Single Item Drug Screening:  How often have you used an illegal drug (including marijuana) or a prescription medication for non-medical reasons in the past year? never    Single Item Drug Screen Score: 0  Interpretation: Negative screen for possible drug use disorder    Brief Intervention  Alcohol & drug use screenings were reviewed. No concerns regarding substance use disorder identified.     Other Counseling Topics:   Car/seat belt/driving safety and calcium and vitamin D intake.     Social Determinants of Health     Financial Resource Strain: Low Risk  (2023)    Overall Financial Resource Strain (CARDIA)     Difficulty of Paying Living Expenses: Not hard at all   Food Insecurity: No Food Insecurity (10/22/2024)    Hunger Vital Sign     Worried About Running Out of Food in the Last Year: Never true     Ran Out of Food in the Last Year: Never true   Transportation  "Needs: No Transportation Needs (10/22/2024)    PRAPARE - Transportation     Lack of Transportation (Medical): No     Lack of Transportation (Non-Medical): No   Housing Stability: Low Risk  (10/22/2024)    Housing Stability Vital Sign     Unable to Pay for Housing in the Last Year: No     Number of Times Moved in the Last Year: 0     Homeless in the Last Year: No   Utilities: Not At Risk (10/22/2024)    [facility] Utilities     Threatened with loss of utilities: No     No results found.    Objective     /50 (BP Location: Left arm, Patient Position: Sitting, Cuff Size: Standard)   Pulse 56   Ht 6' 1\" (1.854 m)   Wt 97.3 kg (214 lb 9.6 oz)   SpO2 93%   BMI 28.31 kg/m²     Physical Exam  Vitals and nursing note reviewed.   Constitutional:       General: He is not in acute distress.     Appearance: Normal appearance. He is well-developed. He is not ill-appearing.   HENT:      Head: Normocephalic and atraumatic.      Right Ear: Tympanic membrane, ear canal and external ear normal. There is no impacted cerumen.      Left Ear: Tympanic membrane, ear canal and external ear normal. There is no impacted cerumen.      Nose: Nose normal. No congestion.      Mouth/Throat:      Pharynx: No posterior oropharyngeal erythema.   Eyes:      Extraocular Movements: Extraocular movements intact.      Conjunctiva/sclera: Conjunctivae normal.      Pupils: Pupils are equal, round, and reactive to light.   Cardiovascular:      Rate and Rhythm: Normal rate and regular rhythm.      Heart sounds: No murmur heard.  Pulmonary:      Effort: Pulmonary effort is normal. No respiratory distress.      Breath sounds: Normal breath sounds.   Abdominal:      Palpations: Abdomen is soft.      Tenderness: There is no abdominal tenderness.   Musculoskeletal:         General: No swelling.      Cervical back: Normal range of motion and neck supple.      Right lower leg: No edema.      Left lower leg: No edema.   Lymphadenopathy:      Cervical: No cervical " adenopathy.   Skin:     General: Skin is warm and dry.      Capillary Refill: Capillary refill takes less than 2 seconds.   Neurological:      General: No focal deficit present.      Mental Status: He is alert.   Psychiatric:         Mood and Affect: Mood normal.         Behavior: Behavior normal.       Administrative Statements   I have spent a total time of 40 minutes in caring for this patient on the day of the visit/encounter including Diagnostic results, Prognosis, Risks and benefits of tx options, Instructions for management, Patient and family education, Importance of tx compliance, Risk factor reductions, Impressions, Counseling / Coordination of care, Documenting in the medical record, Reviewing / ordering tests, medicine, procedures  , Obtaining or reviewing history  , and Communicating with other healthcare professionals .

## 2024-10-24 NOTE — ASSESSMENT & PLAN NOTE
Continue current treatment plan, reviewed labs, needs to establish with new cardiologist. Stable, will continue to monitor.

## 2024-10-24 NOTE — ASSESSMENT & PLAN NOTE
Continue current treatment plan, reviewed labs, needs to establish with new cardiologist. Stable, will continue to monitor.     Orders:    Ambulatory Referral to Cardiology; Future

## 2024-10-24 NOTE — ASSESSMENT & PLAN NOTE
Continue current treatment plan, reviewed labs, continue to monitor  Lab Results   Component Value Date    HGBA1C 7.6 (H) 10/23/2024

## 2024-10-25 ENCOUNTER — E-CONSULT (OUTPATIENT)
Dept: HEMATOLOGY ONCOLOGY | Facility: CLINIC | Age: 69
End: 2024-10-25
Payer: COMMERCIAL

## 2024-10-25 DIAGNOSIS — D69.6 THROMBOCYTOPENIA (HCC): ICD-10-CM

## 2024-10-25 DIAGNOSIS — D75.1 ERYTHROCYTOSIS: Primary | ICD-10-CM

## 2024-10-25 PROCEDURE — 99447 NTRPROF PH1/NTRNET/EHR 11-20: CPT | Performed by: PHYSICIAN ASSISTANT

## 2024-10-29 NOTE — PROGRESS NOTES
E-Consult  Javed Gayle 69 y.o. male MRN: [id number]  Encounter Date: 10/29/24      Reason for Consult / Principal Problem: erythrocytosis, thrombocytopenia     Consulting Provider: Sofi Silverman PA-C    Requesting Provider: Macarena Francisco CRNP       ASSESSMENT:      RECOMMENDATIONS:  Erythrocytosis:  Definition  - Hgb >16.5 g/dL in men or >16.0 g/dL in women  - Hct >49 percent in men or >48 percent in women  Primary cause   - + JAK2, MPL, or CALR   Secondary causes   - Dehydration (early AM lab draw, vomiting/diarrhea, diuretics) -- assess history  - Hypoxia   Chronic/continued tobacco abuse, COPD, sleep apnea -- assess history  - SGLT2 inhibitors (Jardiance, Invokana, Farxiga, etc.) -- assess history  - EPO-producing tumors  hepatocellular carcinoma, renal cell carcinoma, hemangioblastoma, pheochromocytoma, uterine leiomyomata  If concerning for this, U/S abdomen   - Iron overload (hemochromatosis) -- order iron panel    Send to hematology if iron overload or positive JAK2 testing.     Thrombocytopenia:  Assess for B12/folic acid -- supplement if low   U/S abdomen to assess for hepatomegaly/splenomegaly   Very mild right now but not of significant concern.   Repeat CBC-D.     Total time spent 11-20 minutes, >50% of the total time devoted to medical consultative verbal/EMR discussion between providers. Written report will be generated in the EMR..

## 2024-10-30 DIAGNOSIS — D75.1 ERYTHROCYTOSIS: ICD-10-CM

## 2024-10-30 DIAGNOSIS — D58.2 ELEVATED HEMOGLOBIN (HCC): Primary | ICD-10-CM

## 2024-10-30 DIAGNOSIS — D69.6 THROMBOCYTOPENIA (HCC): ICD-10-CM

## 2024-10-30 DIAGNOSIS — D46.9 MYELODYSPLASTIC SYNDROME, UNSPECIFIED (HCC): ICD-10-CM

## 2024-10-30 DIAGNOSIS — D47.1 CHRONIC MYELOPROLIFERATIVE DISEASE (HCC): ICD-10-CM

## 2024-10-31 DIAGNOSIS — H04.129 DRY EYE: ICD-10-CM

## 2024-11-06 DIAGNOSIS — H04.129 DRY EYE: ICD-10-CM

## 2024-11-06 DIAGNOSIS — I48.0 PAF (PAROXYSMAL ATRIAL FIBRILLATION) (HCC): ICD-10-CM

## 2024-11-06 DIAGNOSIS — H81.03 MENIERE'S DISEASE OF BOTH EARS: ICD-10-CM

## 2024-11-06 DIAGNOSIS — E78.00 PURE HYPERCHOLESTEROLEMIA: ICD-10-CM

## 2024-11-06 DIAGNOSIS — L85.3 DRY SKIN DERMATITIS: ICD-10-CM

## 2024-11-06 DIAGNOSIS — E11.65 TYPE 2 DIABETES MELLITUS WITH HYPERGLYCEMIA, WITHOUT LONG-TERM CURRENT USE OF INSULIN (HCC): ICD-10-CM

## 2024-11-06 RX ORDER — APIXABAN 5 MG/1
5 TABLET, FILM COATED ORAL 2 TIMES DAILY
Qty: 60 TABLET | Refills: 0 | Status: SHIPPED | OUTPATIENT
Start: 2024-11-06

## 2024-11-06 RX ORDER — ROSUVASTATIN CALCIUM 5 MG/1
5 TABLET, COATED ORAL EVERY EVENING
Qty: 30 TABLET | Refills: 0 | Status: SHIPPED | OUTPATIENT
Start: 2024-11-06

## 2024-11-06 RX ORDER — LIFITEGRAST 50 MG/ML
SOLUTION/ DROPS OPHTHALMIC
Qty: 60 ML | Refills: 3 | Status: SHIPPED | OUTPATIENT
Start: 2024-11-06

## 2024-11-07 RX ORDER — AMMONIUM LACTATE 12 G/100G
LOTION TOPICAL 2 TIMES DAILY PRN
Qty: 396 G | Refills: 5 | Status: SHIPPED | OUTPATIENT
Start: 2024-11-07

## 2024-11-07 RX ORDER — METOPROLOL TARTRATE 50 MG
50 TABLET ORAL EVERY 12 HOURS
Qty: 180 TABLET | Refills: 1 | Status: SHIPPED | OUTPATIENT
Start: 2024-11-07

## 2024-11-07 RX ORDER — DIAZEPAM 5 MG/1
5 TABLET ORAL EVERY 12 HOURS PRN
Qty: 60 TABLET | Refills: 0 | Status: SHIPPED | OUTPATIENT
Start: 2024-11-07

## 2024-11-29 DIAGNOSIS — E78.00 PURE HYPERCHOLESTEROLEMIA: ICD-10-CM

## 2024-12-02 ENCOUNTER — OFFICE VISIT (OUTPATIENT)
Dept: CARDIOLOGY CLINIC | Facility: CLINIC | Age: 69
End: 2024-12-02
Payer: COMMERCIAL

## 2024-12-02 VITALS
HEIGHT: 73 IN | RESPIRATION RATE: 16 BRPM | OXYGEN SATURATION: 95 % | DIASTOLIC BLOOD PRESSURE: 78 MMHG | WEIGHT: 214 LBS | SYSTOLIC BLOOD PRESSURE: 170 MMHG | BODY MASS INDEX: 28.36 KG/M2 | HEART RATE: 68 BPM

## 2024-12-02 DIAGNOSIS — E11.65 TYPE 2 DIABETES MELLITUS WITH HYPERGLYCEMIA, WITHOUT LONG-TERM CURRENT USE OF INSULIN (HCC): ICD-10-CM

## 2024-12-02 DIAGNOSIS — I48.91 ATRIAL FIBRILLATION WITH RVR (HCC): Primary | ICD-10-CM

## 2024-12-02 DIAGNOSIS — I10 PRIMARY HYPERTENSION: ICD-10-CM

## 2024-12-02 DIAGNOSIS — I48.0 PAF (PAROXYSMAL ATRIAL FIBRILLATION) (HCC): ICD-10-CM

## 2024-12-02 DIAGNOSIS — I71.21 ANEURYSM OF ASCENDING AORTA WITHOUT RUPTURE (HCC): ICD-10-CM

## 2024-12-02 PROCEDURE — 99214 OFFICE O/P EST MOD 30 MIN: CPT | Performed by: INTERNAL MEDICINE

## 2024-12-02 RX ORDER — LOSARTAN POTASSIUM 25 MG/1
25 TABLET ORAL DAILY
Qty: 90 TABLET | Refills: 2 | Status: SHIPPED | OUTPATIENT
Start: 2024-12-02

## 2024-12-02 NOTE — PROGRESS NOTES
"[facility] Cardiology   Office Consultation    Javed Gayle 69 y.o. 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  Continue Lopressor 50 mg twice daily  [id number]: 3; continue Eliquis  He stopped and refuses losartan and Crestor after prolonged discussion.   After prolonged discussion, given his hypertension and for diabetic kidney protection he agrees to resume losartan 25 mg a day.  We will continue surveillance of his valvular heart disease with periodic echo if he agrees. .  Ambulatory blood pressure monitoring and maintaining a low sodium diet was advised.   Smoking cessation advised  He is generally against testing and taking meds.   He was advised to notify us with the onset of cardiac symptoms.      Follow up: 6 months or sooner as needed    1. Atrial fibrillation with RVR (HCC)        2. Primary hypertension        3. Aneurysm of ascending aorta without rupture ([facility])        4. PAF (paroxysmal atrial fibrillation) ([facility])        5. Type 2 diabetes mellitus with hyperglycemia, without long-term current use of insulin ([facility])            HPI: Javed Gayle is a 69 y.o. year old male with history of paroxysmal atrial fibrillation and mitral valve prolapse who presents for routine follow-up.    He now feels well. He had some symptomatic afib in [place] bc he didn't have meds. Ocassioanl palpitations bryan if he doesn't take his evening metoprolol. He at times changes metoprolol dose. He stopped rosuvastatin and lisinopril due to dizzyness and \"the vertigo he has\". In the past he cut his Lopressor dose to 50 mg twice daily however had an increased frequency of atrial fibrillation.      He presented to  [place] in July 2023 with new onset atrial fibrillation.  TTE revealed myxomatous mitral valve with anterior " mitral leaflet prolapse with mild to moderate regurgitation.  He was discharged on Lopressor 75 mg twice daily and Eliquis.  Elective Holter monitor revealed NSR with episodes of PAF.  His A-fib burden was 6.7%.    He has been doing well from a cardiac standpoint.  He denies anginal symptoms or equivalents.  He is predominantly maintaining sinus rhythm.    He has subsequently increased his dose back to 75 mg twice daily with improved control.  He denies any other cardiac concerns at this time.    Family History: father with pacemaker    Social history: smokes a few cigarettes per day    BONY 7/11/2023:  Left Ventricle: Left ventricular cavity size is normal. Wall thickness is normal. Systolic function is normal (60%). Wall motion is normal.    Right Ventricle: Right ventricular cavity size is normal. Systolic function is normal.    Atrial Septum: No patent foramen ovale detected using color flow Doppler at rest.    Left Atrial Appendage: There is normal function. There is no thrombus.    Mitral Valve: There is calcification of the anterior mitral valve leaflet. 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 The posterior leaflet is prolapsed. There is mild to moderate regurgitation.          No Known Allergies      Current Outpatient Medications:     ammonium lactate (LAC-HYDRIN) 12 % lotion, Apply topically 2 (two) times a day as needed for dry skin, Disp: 396 g, Rfl: 5    apixaban (Eliquis) 5 mg, TAKE 1 TABLET BY MOUTH TWICE A DAY, Disp: 60 tablet, Rfl: 0    apixaban (Eliquis) 5 mg, Take 1 tablet (5 mg total) by mouth 2 (two) times a day, Disp: 8 tablet, Rfl: 0    diazepam (VALIUM) 5 mg tablet, Take 1 tablet (5 mg total) by mouth every 12 (twelve) hours as needed for anxiety, Disp: 60 tablet, Rfl: 0    Empagliflozin 25 MG TABS, Take 1 tablet (25 mg total) by mouth every morning, Disp: 90 tablet, Rfl: 1    lifitegrast (Xiidra) 5 % op solution, INSTILL 1 DROP INTO BOTH EYES TWICE  A DAY, Disp: 60 mL, Rfl: 3    lifitegrast (XIIDRA) 5 % op solution, Administer 1 drop to both eyes 2 (two) times a day, Disp: 12 each, Rfl: 0    metoprolol tartrate (LOPRESSOR) 50 mg tablet, Take 1 tablet (50 mg total) by mouth every 12 (twelve) hours, Disp: 180 tablet, Rfl: 1    rosuvastatin (CRESTOR) 5 mg tablet, TAKE 1 TABLET BY MOUTH EVERY DAY IN THE EVENING, Disp: 30 tablet, Rfl: 0    Past Medical History:   Diagnosis Date    Diabetes mellitus (HCC)     Hypertension     Meniere's disease        History reviewed. No pertinent family history.    Past Surgical History:   Procedure Laterality Date    HERNIA REPAIR         Social History     Socioeconomic History    Marital status: /Civil Union     Spouse name: Not on file    Number of children: Not on file    Years of education: Not on file    Highest education level: Not on file   Occupational History    Not on file   Tobacco Use    Smoking status: Some Days     Types: Cigarettes    Smokeless tobacco: Never   Vaping Use    Vaping status: Never Used   Substance and Sexual Activity    Alcohol use: Never    Drug use: Never    Sexual activity: Not on file   Other Topics Concern    Not on file   Social History Narrative    Not on file     Social Drivers of Health     Financial Resource Strain: Low Risk  (8/8/2023)    Overall Financial Resource Strain (CARDIA)     Difficulty of Paying Living Expenses: Not hard at all   Food Insecurity: No Food Insecurity (10/22/2024)    Hunger Vital Sign     Worried About Running Out of Food in the Last Year: Never true     Ran Out of Food in the Last Year: Never true   Transportation Needs: No Transportation Needs (10/22/2024)    PRAPARE - Transportation     Lack of Transportation (Medical): No     Lack of Transportation (Non-Medical): No   Physical Activity: Unknown (5/18/2023)    Received from [facility]    Exercise Vital Sign     Days of Exercise per Week: 2 days     Minutes of Exercise per Session: Not on  file   Stress: Stress Concern Present (5/18/2023)    [facility] from [facility], [facility]    [employer] [place] [employer] - Occupational Stress Questionnaire     Feeling of Stress : To some extent   Social Connections: Unknown (5/18/2023)    Received from [facility], [facility]    Social Connection and Isolation Panel [NHANES]     Frequency of Communication with Friends and Family: More than three times a week     Frequency of Social Gatherings with Friends and Family: Once a week     Attends Scientology Services: Patient declined     Active Member of Clubs or Organizations: Patient declined     Attends Club or Organization Meetings: Not on file     Marital Status:    Intimate Partner Violence: Not At Risk (5/18/2023)    Received from [facility], [facility]    Humiliation, Afraid, Rape, and Kick questionnaire     Fear of Current or Ex-Partner: No     Emotionally Abused: No     Physically Abused: No     Sexually Abused: No   Housing Stability: Low Risk  (10/22/2024)    Housing Stability Vital Sign     Unable to Pay for Housing in the Last Year: No     Number of Times Moved in the Last Year: 0     Homeless in the Last Year: No       Review of Systems   Constitutional: Negative for diaphoresis, weight gain and weight loss.   HENT:  Negative for congestion.    Cardiovascular:  Negative for chest pain, dyspnea on exertion, irregular heartbeat, leg swelling, near-syncope, orthopnea, palpitations, paroxysmal nocturnal dyspnea and syncope.   Respiratory:  Negative for shortness of breath, sleep disturbances due to breathing and snoring.    Hematologic/Lymphatic: Does not bruise/bleed easily.   Skin:  Negative for rash.   Musculoskeletal:  Negative for myalgias.   Gastrointestinal:  Negative for nausea and vomiting.   Neurological:  Negative for excessive daytime sleepiness and light-headedness.    Psychiatric/Behavioral:  The patient is not nervous/anxious.        Vitals: There were no vitals taken for this visit.      Physical Exam:     GEN: Alert and oriented x 3, in no acute distress.  Well appearing and well nourished.   HEENT: Sclera anicteric, conjunctivae pink, mucous membranes moist. Oropharynx clear.   NECK: Supple, no carotid bruits, no significant JVD. Trachea midline, no thyromegaly.   HEART: Regular rhythm, normal S1 and S2, no murmurs, clicks, gallops or rubs. PMI nondisplaced, no thrills.   LUNGS: Clear to auscultation bilaterally; no wheezes, rales, or rhonchi. No increased work of breathing or signs of respiratory distress.   ABDOMEN: Soft, nontender, nondistended, normoactive bowel sounds.   EXTREMITIES: Skin warm and well perfused, no clubbing, cyanosis, or edema.  NEURO: No focal findings. Normal speech. Mood and affect normal.   SKIN: Normal without suspicious lesions on exposed skin.

## 2024-12-03 DIAGNOSIS — I48.0 PAF (PAROXYSMAL ATRIAL FIBRILLATION) (HCC): ICD-10-CM

## 2024-12-10 DIAGNOSIS — E78.00 PURE HYPERCHOLESTEROLEMIA: ICD-10-CM

## 2024-12-10 DIAGNOSIS — L85.3 DRY SKIN DERMATITIS: ICD-10-CM

## 2024-12-10 DIAGNOSIS — H81.03 MENIERE'S DISEASE OF BOTH EARS: ICD-10-CM

## 2024-12-10 DIAGNOSIS — I48.0 PAF (PAROXYSMAL ATRIAL FIBRILLATION) (HCC): ICD-10-CM

## 2024-12-10 DIAGNOSIS — H04.129 DRY EYE: ICD-10-CM

## 2024-12-10 DIAGNOSIS — E11.65 TYPE 2 DIABETES MELLITUS WITH HYPERGLYCEMIA, WITHOUT LONG-TERM CURRENT USE OF INSULIN (HCC): ICD-10-CM

## 2024-12-11 RX ORDER — METOPROLOL TARTRATE 50 MG
50 TABLET ORAL EVERY 12 HOURS
Qty: 180 TABLET | Refills: 1 | Status: SHIPPED | OUTPATIENT
Start: 2024-12-11

## 2024-12-11 RX ORDER — DIAZEPAM 5 MG/1
5 TABLET ORAL EVERY 12 HOURS PRN
Qty: 60 TABLET | Refills: 0 | Status: SHIPPED | OUTPATIENT
Start: 2024-12-11

## 2024-12-11 RX ORDER — AMMONIUM LACTATE 12 G/100G
LOTION TOPICAL 2 TIMES DAILY PRN
Qty: 396 G | Refills: 0 | Status: SHIPPED | OUTPATIENT
Start: 2024-12-11

## 2024-12-12 RX ORDER — ROSUVASTATIN CALCIUM 5 MG/1
5 TABLET, COATED ORAL EVERY EVENING
Qty: 90 TABLET | Refills: 3 | Status: SHIPPED | OUTPATIENT
Start: 2024-12-12

## 2024-12-12 RX ORDER — APIXABAN 5 MG/1
5 TABLET, FILM COATED ORAL 2 TIMES DAILY
Qty: 60 TABLET | Refills: 0 | Status: SHIPPED | OUTPATIENT
Start: 2024-12-12

## 2024-12-12 RX ORDER — ROSUVASTATIN CALCIUM 5 MG/1
5 TABLET, COATED ORAL EVERY EVENING
Qty: 90 TABLET | Refills: 1 | Status: SHIPPED | OUTPATIENT
Start: 2024-12-12

## 2024-12-15 ENCOUNTER — VBI (OUTPATIENT)
Dept: ADMINISTRATIVE | Facility: OTHER | Age: 69
End: 2024-12-15

## 2024-12-16 NOTE — TELEPHONE ENCOUNTER
12/15/24 7:41 PM     Chart reviewed for Hemoglobin A1c was/were submitted to the patient's insurance.     Magui Cuenca   [facility]

## 2024-12-16 NOTE — TELEPHONE ENCOUNTER
12/15/24 7:39 PM     Chart reviewed for Diabetic Eye Exam and Hemoglobin A1c ; nothing is submitted to the patient's insurance at this time.     Magui Cuenca   [facility]

## 2024-12-19 ENCOUNTER — VBI (OUTPATIENT)
Dept: ADMINISTRATIVE | Facility: OTHER | Age: 69
End: 2024-12-19

## 2024-12-19 NOTE — TELEPHONE ENCOUNTER
12/19/24 3:17 PM     Chart reviewed for Diabetic Eye Exam ; nothing is submitted to the patient's insurance at this time.     Theo Arzate MA   [facility]

## 2025-06-05 ENCOUNTER — TELEPHONE (OUTPATIENT)
Age: 70
End: 2025-06-05

## 2025-06-05 DIAGNOSIS — Z12.5 SCREENING FOR PROSTATE CANCER: Primary | ICD-10-CM

## 2025-06-05 NOTE — TELEPHONE ENCOUNTER
Phone call from patient's wife Casi asking if a PSA can be added to the lab orders he needs to get.  Please advise. Thank you.

## 2025-06-09 ENCOUNTER — VBI (OUTPATIENT)
Dept: ADMINISTRATIVE | Facility: OTHER | Age: 70
End: 2025-06-09

## 2025-06-09 NOTE — TELEPHONE ENCOUNTER
06/09/25 3:18 PM     Chart reviewed for CRC: Colonoscopy ; nothing is submitted to the patient's insurance at this time.     Magui Cuenca [facility]

## 2025-07-01 ENCOUNTER — RA CDI HCC (OUTPATIENT)
Dept: OTHER | Facility: HOSPITAL | Age: 70
End: 2025-07-01

## 2025-07-03 ENCOUNTER — APPOINTMENT (OUTPATIENT)
Dept: LAB | Facility: CLINIC | Age: 70
End: 2025-07-03
Payer: COMMERCIAL

## 2025-07-03 DIAGNOSIS — D69.6 THROMBOCYTOPENIA (HCC): ICD-10-CM

## 2025-07-03 DIAGNOSIS — D58.2 ELEVATED HEMOGLOBIN (HCC): ICD-10-CM

## 2025-07-03 DIAGNOSIS — Z12.5 SCREENING FOR PROSTATE CANCER: ICD-10-CM

## 2025-07-03 DIAGNOSIS — D75.1 ERYTHROCYTOSIS: ICD-10-CM

## 2025-07-03 LAB
BASOPHILS # BLD AUTO: 0.07 THOUSANDS/ÂΜL (ref 0–0.1)
BASOPHILS NFR BLD AUTO: 1 % (ref 0–1)
EOSINOPHIL # BLD AUTO: 0.26 THOUSAND/ÂΜL (ref 0–0.61)
EOSINOPHIL NFR BLD AUTO: 3 % (ref 0–6)
ERYTHROCYTE [DISTWIDTH] IN BLOOD BY AUTOMATED COUNT: 12.3 % (ref 11.6–15.1)
FERRITIN SERPL-MCNC: 93 NG/ML (ref 30–336)
FOLATE SERPL-MCNC: >22.3 NG/ML
HCT VFR BLD AUTO: 54.5 % (ref 36.5–49.3)
HGB BLD-MCNC: 18.6 G/DL (ref 12–17)
IMM GRANULOCYTES # BLD AUTO: 0.04 THOUSAND/UL (ref 0–0.2)
IMM GRANULOCYTES NFR BLD AUTO: 1 % (ref 0–2)
IRON SATN MFR SERPL: 23 % (ref 15–50)
IRON SERPL-MCNC: 84 UG/DL (ref 50–212)
LYMPHOCYTES # BLD AUTO: 2.37 THOUSANDS/ÂΜL (ref 0.6–4.47)
LYMPHOCYTES NFR BLD AUTO: 31 % (ref 14–44)
MCH RBC QN AUTO: 32.4 PG (ref 26.8–34.3)
MCHC RBC AUTO-ENTMCNC: 34.1 G/DL (ref 31.4–37.4)
MCV RBC AUTO: 95 FL (ref 82–98)
MONOCYTES # BLD AUTO: 0.73 THOUSAND/ÂΜL (ref 0.17–1.22)
MONOCYTES NFR BLD AUTO: 10 % (ref 4–12)
NEUTROPHILS # BLD AUTO: 4.13 THOUSANDS/ÂΜL (ref 1.85–7.62)
NEUTS SEG NFR BLD AUTO: 54 % (ref 43–75)
NRBC BLD AUTO-RTO: 0 /100 WBCS
PLATELET # BLD AUTO: 150 THOUSANDS/UL (ref 149–390)
PMV BLD AUTO: 10 FL (ref 8.9–12.7)
PSA SERPL-MCNC: 2.36 NG/ML (ref 0–4)
RBC # BLD AUTO: 5.74 MILLION/UL (ref 3.88–5.62)
TIBC SERPL-MCNC: 358.4 UG/DL (ref 250–450)
TRANSFERRIN SERPL-MCNC: 256 MG/DL (ref 203–362)
UIBC SERPL-MCNC: 274 UG/DL (ref 155–355)
VIT B12 SERPL-MCNC: 493 PG/ML (ref 180–914)
WBC # BLD AUTO: 7.6 THOUSAND/UL (ref 4.31–10.16)

## 2025-07-03 PROCEDURE — 82728 ASSAY OF FERRITIN: CPT

## 2025-07-03 PROCEDURE — 82746 ASSAY OF FOLIC ACID SERUM: CPT

## 2025-07-03 PROCEDURE — 36415 COLL VENOUS BLD VENIPUNCTURE: CPT

## 2025-07-03 PROCEDURE — 83550 IRON BINDING TEST: CPT

## 2025-07-03 PROCEDURE — 85025 COMPLETE CBC W/AUTO DIFF WBC: CPT

## 2025-07-03 PROCEDURE — 83540 ASSAY OF IRON: CPT

## 2025-07-03 PROCEDURE — G0103 PSA SCREENING: HCPCS

## 2025-07-03 PROCEDURE — 82607 VITAMIN B-12: CPT

## 2025-07-10 ENCOUNTER — TELEPHONE (OUTPATIENT)
Dept: FAMILY MEDICINE CLINIC | Facility: CLINIC | Age: 70
End: 2025-07-10

## 2025-07-10 ENCOUNTER — OFFICE VISIT (OUTPATIENT)
Dept: FAMILY MEDICINE CLINIC | Facility: CLINIC | Age: 70
End: 2025-07-10
Payer: COMMERCIAL

## 2025-07-10 VITALS
OXYGEN SATURATION: 95 % | SYSTOLIC BLOOD PRESSURE: 142 MMHG | HEART RATE: 56 BPM | DIASTOLIC BLOOD PRESSURE: 88 MMHG | HEIGHT: 73 IN | BODY MASS INDEX: 27.83 KG/M2 | WEIGHT: 210 LBS

## 2025-07-10 DIAGNOSIS — L97.318: ICD-10-CM

## 2025-07-10 DIAGNOSIS — J44.9 CHRONIC OBSTRUCTIVE PULMONARY DISEASE, UNSPECIFIED COPD TYPE (HCC): ICD-10-CM

## 2025-07-10 DIAGNOSIS — H81.03 MENIERE'S DISEASE OF BOTH EARS: ICD-10-CM

## 2025-07-10 DIAGNOSIS — E27.9 ADRENAL NODULE (HCC): ICD-10-CM

## 2025-07-10 DIAGNOSIS — I10 PRIMARY HYPERTENSION: Primary | ICD-10-CM

## 2025-07-10 DIAGNOSIS — I83.213: ICD-10-CM

## 2025-07-10 DIAGNOSIS — I73.9 PERIPHERAL VASCULAR DISEASE (HCC): ICD-10-CM

## 2025-07-10 DIAGNOSIS — E11.65 TYPE 2 DIABETES MELLITUS WITH HYPERGLYCEMIA, WITHOUT LONG-TERM CURRENT USE OF INSULIN (HCC): ICD-10-CM

## 2025-07-10 DIAGNOSIS — I48.0 PAF (PAROXYSMAL ATRIAL FIBRILLATION) (HCC): ICD-10-CM

## 2025-07-10 DIAGNOSIS — I87.2 VENOUS INSUFFICIENCY (CHRONIC) (PERIPHERAL): ICD-10-CM

## 2025-07-10 LAB — SL AMB POCT HEMOGLOBIN AIC: 8.2 (ref ?–6.5)

## 2025-07-10 PROCEDURE — 83036 HEMOGLOBIN GLYCOSYLATED A1C: CPT

## 2025-07-10 PROCEDURE — 99214 OFFICE O/P EST MOD 30 MIN: CPT

## 2025-07-10 RX ORDER — LISINOPRIL 5 MG/1
5 TABLET ORAL DAILY
Qty: 30 TABLET | Refills: 1 | Status: SHIPPED | OUTPATIENT
Start: 2025-07-10

## 2025-07-10 RX ORDER — DIAZEPAM 5 MG/1
5 TABLET ORAL EVERY 12 HOURS PRN
Qty: 60 TABLET | Refills: 0 | Status: SHIPPED | OUTPATIENT
Start: 2025-07-10

## 2025-07-10 NOTE — ASSESSMENT & PLAN NOTE
Valium as needed   Orders:  •  diazepam (VALIUM) 5 mg tablet; Take 1 tablet (5 mg total) by mouth every 12 (twelve) hours as needed for anxiety

## 2025-07-10 NOTE — ASSESSMENT & PLAN NOTE
Will start low dose lisinopril, will monitor at home. Do recommend follow up in 1 month for BP check   Orders:  •  CBC and differential  •  Comprehensive metabolic panel  •  Hemoglobin A1C  •  Lipid panel  •  Urinalysis with microscopic  •  Albumin / creatinine urine ratio  •  lisinopril (ZESTRIL) 5 mg tablet; Take 1 tablet (5 mg total) by mouth daily

## 2025-07-10 NOTE — TELEPHONE ENCOUNTER
Please note that patient request that when he's getting blood work to make sure his A1c is listed.  Thanks

## 2025-07-10 NOTE — PROGRESS NOTES
Name: Javed Gayle      : 1955      MRN: [id number]  Encounter Provider: GRACIA Gomez  Encounter Date: 7/10/2025   Encounter department: [facility] [address]  :  Assessment & Plan  Primary hypertension  Will start low dose lisinopril, will monitor at home. Do recommend follow up in 1 month for BP check   Orders:  •  CBC and differential  •  Comprehensive metabolic panel  •  Hemoglobin A1C  •  Lipid panel  •  Urinalysis with microscopic  •  Albumin / creatinine urine ratio  •  lisinopril (ZESTRIL) 5 mg tablet; Take 1 tablet (5 mg total) by mouth daily    Type 2 diabetes mellitus with hyperglycemia, without long-term current use of insulin ([facility])  A1C today, continue jardiance, will continue to monitor.   Lab Results   Component Value Date    HGBA1C 7.6 (H) 10/23/2024       Orders:  •  CBC and differential  •  Comprehensive metabolic panel  •  Hemoglobin A1C  •  Lipid panel  •  Urinalysis with microscopic  •  Albumin / creatinine urine ratio  •  lisinopril (ZESTRIL) 5 mg tablet; Take 1 tablet (5 mg total) by mouth daily    Varicose veins of right lower extremity with inflammation, with ulcer of ankle of other severity (HCC)  Have vas study and follow up with vascular        Chronic obstructive pulmonary disease, unspecified COPD type ([facility])  Stable, will continue to monitor        PAF (paroxysmal atrial fibrillation) ([facility])  Stable continue with cardiology, compliant with metoprolol and eliquis        Meniere's disease of both ears  Valium as needed   Orders:  •  diazepam (VALIUM) 5 mg tablet; Take 1 tablet (5 mg total) by mouth every 12 (twelve) hours as needed for anxiety    Peripheral vascular disease (HCC)  Have vas study and follow up with vascular   Orders:  •  Ambulatory Referral to Vascular Surgery; Future  •  VAS Lower extremity venous insufficiency duplex, bilateral w/ measurements; Future    Venous insufficiency (chronic) (peripheral)  Have vas study  "and follow up with vascular   Orders:  •  VAS Lower extremity venous insufficiency duplex, bilateral w/ measurements; Future    Adrenal nodule (HCC)  Reviewed previous imaging, will repeat imaging to establish stability, we will call with results  Orders:  •  CT abdomen pelvis wo contrast; Future           History of Present Illness   HPI  Review of Systems   Constitutional:  Negative for chills, diaphoresis and fever.   Respiratory:  Negative for cough, chest tightness and shortness of breath.    Cardiovascular:  Negative for chest pain and palpitations.   Musculoskeletal:  Positive for arthralgias. Negative for back pain.   Skin:  Positive for color change.   Neurological:  Positive for dizziness and weakness.   All other systems reviewed and are negative.      Objective   /88   Pulse 56   Ht 6' 1\" (1.854 m)   Wt 95.3 kg (210 lb)   SpO2 95%   BMI 27.71 kg/m²     and repeat imaging  Physical Exam  Vitals and nursing note reviewed.   Constitutional:       General: He is not in acute distress.     Appearance: Normal appearance. He is well-developed. He is not ill-appearing.   HENT:      Head: Normocephalic and atraumatic.      Right Ear: Tympanic membrane, ear canal and external ear normal. There is no impacted cerumen.      Left Ear: Tympanic membrane, ear canal and external ear normal. There is no impacted cerumen.      Nose: Nose normal. No congestion.      Mouth/Throat:      Mouth: Mucous membranes are moist.      Pharynx: No oropharyngeal exudate or posterior oropharyngeal erythema.     Eyes:      Extraocular Movements: Extraocular movements intact.      Conjunctiva/sclera: Conjunctivae normal.      Pupils: Pupils are equal, round, and reactive to light.       Cardiovascular:      Rate and Rhythm: Normal rate and regular rhythm.      Pulses: no weak pulses.           Dorsalis pedis pulses are 2+ on the right side and 2+ on the left side.        Posterior tibial pulses are 2+ on the right side and 2+ " on the left side.      Heart sounds: No murmur heard.  Pulmonary:      Effort: Pulmonary effort is normal. No respiratory distress.      Breath sounds: Normal breath sounds.   Abdominal:      Palpations: Abdomen is soft.      Tenderness: There is no abdominal tenderness.     Musculoskeletal:         General: No swelling.      Cervical back: Normal range of motion and neck supple.      Right lower leg: No edema.      Left lower leg: No edema.   Feet:      Right foot:      Skin integrity: Erythema present. No ulcer, skin breakdown, warmth, callus or dry skin.      Left foot:      Skin integrity: Erythema present. No ulcer, skin breakdown, warmth, callus or dry skin.   Lymphadenopathy:      Cervical: No cervical adenopathy.     Skin:     General: Skin is warm and dry.      Capillary Refill: Capillary refill takes less than 2 seconds.     Neurological:      General: No focal deficit present.      Mental Status: He is alert and oriented to person, place, and time.     Psychiatric:         Mood and Affect: Mood normal.         Behavior: Behavior normal.       Diabetic Foot Exam    Patient's shoes and socks removed.    Right Foot/Ankle   Right Foot Inspection  Skin Exam: skin normal and erythema. Skin not intact, no dry skin, no warmth, no callus, no maceration, no abnormal color, no pre-ulcer, no ulcer and no callus.     Toe Exam: ROM and strength within normal limits.     Sensory   Proprioception: intact  Monofilament testing: intact    Vascular  Capillary refills: < 3 seconds  The right DP pulse is 2+. The right PT pulse is 2+.     Left Foot/Ankle  Left Foot Inspection  Skin Exam: skin normal and erythema. Skin not intact, no dry skin, no warmth, no maceration, normal color, no pre-ulcer, no ulcer and no callus.     Toe Exam: ROM and strength within normal limits.     Sensory   Proprioception: intact  Monofilament testing: intact    Vascular  Capillary refills: < 3 seconds  The left DP pulse is 2+. The left PT pulse is  2+.     Assign Risk Category  No deformity present  No loss of protective sensation  No weak pulses  Risk: 0

## 2025-07-10 NOTE — ASSESSMENT & PLAN NOTE
A1C today, continue jardiance, will continue to monitor.   Lab Results   Component Value Date    HGBA1C 7.6 (H) 10/23/2024       Orders:  •  CBC and differential  •  Comprehensive metabolic panel  •  Hemoglobin A1C  •  Lipid panel  •  Urinalysis with microscopic  •  Albumin / creatinine urine ratio  •  lisinopril (ZESTRIL) 5 mg tablet; Take 1 tablet (5 mg total) by mouth daily

## 2025-07-30 ENCOUNTER — HOSPITAL ENCOUNTER (OUTPATIENT)
Dept: NON INVASIVE DIAGNOSTICS | Facility: CLINIC | Age: 70
Discharge: HOME | End: 2025-07-30
Payer: COMMERCIAL

## 2025-07-30 DIAGNOSIS — I87.2 VENOUS INSUFFICIENCY (CHRONIC) (PERIPHERAL): ICD-10-CM

## 2025-07-30 DIAGNOSIS — I73.9 PERIPHERAL VASCULAR DISEASE (HCC): ICD-10-CM

## 2025-07-30 PROCEDURE — 93970 EXTREMITY STUDY: CPT

## 2025-07-30 PROCEDURE — 93970 EXTREMITY STUDY: CPT | Performed by: SURGERY

## 2025-08-01 DIAGNOSIS — I48.0 PAF (PAROXYSMAL ATRIAL FIBRILLATION) (HCC): ICD-10-CM

## 2025-08-03 DIAGNOSIS — E11.65 TYPE 2 DIABETES MELLITUS WITH HYPERGLYCEMIA, WITHOUT LONG-TERM CURRENT USE OF INSULIN (HCC): ICD-10-CM

## 2025-08-03 DIAGNOSIS — I10 PRIMARY HYPERTENSION: ICD-10-CM

## 2025-08-05 DIAGNOSIS — I87.2 VENOUS INCOMPETENCE: Primary | ICD-10-CM

## 2025-08-05 RX ORDER — LISINOPRIL 5 MG/1
5 TABLET ORAL DAILY
Qty: 90 TABLET | Refills: 1 | Status: SHIPPED | OUTPATIENT
Start: 2025-08-05

## 2025-08-07 DIAGNOSIS — E11.65 TYPE 2 DIABETES MELLITUS WITH HYPERGLYCEMIA, WITHOUT LONG-TERM CURRENT USE OF INSULIN (HCC): ICD-10-CM

## 2025-08-07 DIAGNOSIS — I48.0 PAF (PAROXYSMAL ATRIAL FIBRILLATION) (HCC): ICD-10-CM

## 2025-08-08 RX ORDER — METOPROLOL TARTRATE 50 MG
50 TABLET ORAL 2 TIMES DAILY
Qty: 180 TABLET | Refills: 1 | Status: SHIPPED | OUTPATIENT
Start: 2025-08-08

## 2025-08-08 RX ORDER — EMPAGLIFLOZIN 25 MG/1
25 TABLET, FILM COATED ORAL EVERY MORNING
Qty: 90 TABLET | Refills: 1 | Status: SHIPPED | OUTPATIENT
Start: 2025-08-08